# Patient Record
Sex: MALE | Race: WHITE | Employment: FULL TIME | URBAN - METROPOLITAN AREA
[De-identification: names, ages, dates, MRNs, and addresses within clinical notes are randomized per-mention and may not be internally consistent; named-entity substitution may affect disease eponyms.]

---

## 2020-09-15 ENCOUNTER — HOSPITAL ENCOUNTER (EMERGENCY)
Age: 45
Discharge: HOME | End: 2020-09-15
Attending: EMERGENCY MEDICINE
Payer: COMMERCIAL

## 2020-09-15 VITALS
RESPIRATION RATE: 14 BRPM | HEART RATE: 81 BPM | TEMPERATURE: 98.5 F | SYSTOLIC BLOOD PRESSURE: 124 MMHG | WEIGHT: 245 LBS | HEIGHT: 69 IN | OXYGEN SATURATION: 97 % | DIASTOLIC BLOOD PRESSURE: 83 MMHG | BODY MASS INDEX: 36.29 KG/M2

## 2020-09-15 LAB — STREP GRP A PCR: NEGATIVE

## 2020-09-15 PROCEDURE — 87880 STREP A ASSAY W/OPTIC: CPT

## 2020-09-15 PROCEDURE — 99283 EMERGENCY DEPT VISIT LOW MDM: CPT

## 2020-09-15 PROCEDURE — 6360000002 HC RX W HCPCS: Performed by: EMERGENCY MEDICINE

## 2020-09-15 RX ORDER — IBUPROFEN 800 MG/1
800 TABLET ORAL EVERY 8 HOURS PRN
Qty: 30 TABLET | Refills: 0 | Status: SHIPPED | OUTPATIENT
Start: 2020-09-15 | End: 2021-04-13

## 2020-09-15 RX ORDER — DEXAMETHASONE SODIUM PHOSPHATE 10 MG/ML
10 INJECTION, SOLUTION INTRAMUSCULAR; INTRAVENOUS ONCE
Status: DISCONTINUED | OUTPATIENT
Start: 2020-09-15 | End: 2020-09-15

## 2020-09-15 RX ADMIN — DEXAMETHASONE INTENSOL 10 MG: 1 SOLUTION, CONCENTRATE ORAL at 11:28

## 2020-09-15 ASSESSMENT — ENCOUNTER SYMPTOMS
EYE DISCHARGE: 0
DIARRHEA: 0
RHINORRHEA: 0
SHORTNESS OF BREATH: 0
EYE PAIN: 0
SINUS PRESSURE: 0
NAUSEA: 0
COUGH: 0
ABDOMINAL PAIN: 0
EYE REDNESS: 0
VOMITING: 0
WHEEZING: 0
SORE THROAT: 1
TROUBLE SWALLOWING: 1
BACK PAIN: 0
SINUS PAIN: 0

## 2020-09-15 ASSESSMENT — PAIN DESCRIPTION - PAIN TYPE: TYPE: ACUTE PAIN

## 2020-09-15 ASSESSMENT — PAIN SCALES - GENERAL: PAINLEVEL_OUTOF10: 9

## 2020-09-15 ASSESSMENT — PAIN DESCRIPTION - LOCATION: LOCATION: THROAT

## 2020-09-15 NOTE — ED PROVIDER NOTES
[facility]  Department of Emergency Medicine     Written by: Michell Goins DO  Patient Name: Sarahy Lizarraga  Attending Provider: No att. providers found  Admit Date: 9/15/2020  9:41 AM  MRN: [id number]                   : 1975        Chief Complaint   Patient presents with   Lorene rowe, seen by Dr Vicki Erazo yesterday and given ear drops and atb for throat, states they \"aren't working\"    Pharyngitis     states was sent in for possible abscess    - Chief complaint    Patient is 61-year-old male no sniffing past medical history. Patient states he has had right ear pain and sore throat since . Patient states that he recently saw his PCP yesterday where he was prescribed azithromycin which he has been taking. Patient states he has had increasing pain and difficulty swallowing prompted his visit today. In addition patient notes some subjective fevers and chills. Patient states that it is difficult to talk or swallow. Patient denies any nausea, vomiting, chest pain, cough, abdominal pain or shortness of breath. The history is provided by the patient. No  was used. Review of Systems   Constitutional: Positive for fever. Negative for chills. HENT: Positive for ear pain, sore throat and trouble swallowing. Negative for postnasal drip, rhinorrhea, sinus pressure and sinus pain. Eyes: Negative for pain, discharge and redness. Respiratory: Negative for cough, shortness of breath and wheezing. Cardiovascular: Negative for chest pain. Gastrointestinal: Negative for abdominal pain, diarrhea, nausea and vomiting. Genitourinary: Negative for dysuria and frequency. Musculoskeletal: Negative for arthralgias and back pain. Skin: Negative for rash and wound. Neurological: Negative for weakness and headaches. Hematological: Negative for adenopathy. All other systems reviewed and are negative.        Physical Exam  Vitals signs and nursing note reviewed. Constitutional:       Appearance: He is well-developed. HENT:      Head: Normocephalic and atraumatic. Right Ear: A middle ear effusion is present. Tympanic membrane is erythematous. Mouth/Throat:      Pharynx: Uvula midline. Pharyngeal swelling, oropharyngeal exudate and posterior oropharyngeal erythema present. Eyes:      Conjunctiva/sclera: Conjunctivae normal.   Neck:      Musculoskeletal: Normal range of motion and neck supple. Cardiovascular:      Rate and Rhythm: Normal rate and regular rhythm. Heart sounds: Normal heart sounds. No murmur. Pulmonary:      Effort: Pulmonary effort is normal. No respiratory distress. Breath sounds: Normal breath sounds. No wheezing or rales. Abdominal:      General: Bowel sounds are normal.      Palpations: Abdomen is soft. Tenderness: There is no abdominal tenderness. There is no guarding or rebound. Musculoskeletal:         General: No tenderness or deformity. Skin:     General: Skin is warm and dry. Neurological:      Mental Status: He is alert and oriented to person, place, and time. Cranial Nerves: No cranial nerve deficit. Coordination: Coordination normal.          Procedures       MDM  Number of Diagnoses or Management Options  Acute pharyngitis, unspecified etiology:   Diagnosis management comments: Patient 68-year-old male presents with chief complaint of sore throat and right ear pain. Patient recently seen the PCP and started on azithromycin. Vital signs stable on presentation physical exam mild erythema and effusion to right tympanic membrane. In addition erythema and exudate to right tonsil. Uvula midline no peritonsillar abscess noted. Patient swab for strep which was negative. Findings consistent with viral pharyngitis. Patient given Decadron 10 mg oral solution in emergency department. Discussed discharge with patient.   Instructed patient to take ibuprofen 800 mg up to 3 times daily as needed for pain. Patient instructed to also use warm salt water swishes to aid in pain. If no improvement in 3 days patient should follow-up with his primary care provider. In addition the patient notes any new or worrisome symptoms he should return emergency department as soon as possible for reevaluation. Patient voiced understanding of all instructions and was discharged home in stable condition. Amount and/or Complexity of Data Reviewed  Clinical lab tests: ordered and reviewed    Risk of Complications, Morbidity, and/or Mortality  Presenting problems: low  Diagnostic procedures: low  Management options: low    Patient Progress  Patient progress: stable       ED Course as of Sep 15 1105   Tue Sep 15, 2020   1056 ATTENDING PROVIDER ATTESTATION:     I have personally performed and/or participated in the history, exam, medical decision making, and procedures and agree with all pertinent clinical information. I have also reviewed and agree with the past medical, family and social history unless otherwise noted. I have discussed this patient in detail with the resident, and provided the instruction and education regarding tonsillitis. My findings/Plan: 64-year-old male with right ear pain and right pharyngitis he has erythema, hypertrophy, exudates of right tonsil with right tonsillar adenopathy however strep screen is negative. Patient is started on azithromycin yesterday we will have patient continue return for inability to swallow trismus or any other worsening concerning symptoms        [MF]      ED Course User Index  [MF] Cole Cohn, DO        --------------------------------------------- PAST HISTORY ---------------------------------------------  Past Medical History:  has no past medical history on file. Past Surgical History:  has no past surgical history on file. Social History:  reports that he has never smoked.  He has never used smokeless tobacco. He reports previous alcohol use. Family History: family history is not on file. The patients home medications have been reviewed. Allergies: Penicillins    -------------------------------------------------- RESULTS -------------------------------------------------  Labs:  Results for orders placed or performed during the hospital encounter of 09/15/20   Strep Screen Group A Throat    Specimen: Throat   Result Value Ref Range    Strep Grp A PCR Negative Negative       Radiology:  No orders to display       ------------------------- NURSING NOTES AND VITALS REVIEWED ---------------------------  Date / Time Roomed:  9/15/2020  9:41 AM  ED Bed Assignment:  02/02    The nursing notes within the ED encounter and vital signs as below have been reviewed. /79   Pulse 87   Temp 97.8 °F (36.6 °C) (Temporal)   Resp 16   Ht 5' 9\" (1.753 m)   Wt 245 lb (111.1 kg)   SpO2 98%   BMI 36.18 kg/m²   Oxygen Saturation Interpretation: Normal      ------------------------------------------ PROGRESS NOTES ------------------------------------------  11:05 AM EDT  I have spoken with the patient and discussed todays results, in addition to providing specific details for the plan of care and counseling regarding the diagnosis and prognosis. Their questions are answered at this time and they are agreeable with the plan. I discussed at length with them reasons for immediate return here for re evaluation. They will followup with their primary care physician by calling their office tomorrow. --------------------------------- ADDITIONAL PROVIDER NOTES ---------------------------------  At this time the patient is without objective evidence of an acute process requiring hospitalization or inpatient management. They have remained hemodynamically stable throughout their entire ED visit and are stable for discharge with outpatient follow-up.      The plan has been discussed in detail and they are aware of the specific conditions for emergent return, as well as the importance of follow-up. New Prescriptions    IBUPROFEN (ADVIL;MOTRIN) 800 MG TABLET    Take 1 tablet by mouth every 8 hours as needed for Pain       Diagnosis:  1. Acute pharyngitis, unspecified etiology        Disposition:  Patient's disposition: Discharge to home  Patient's condition is stable. Patient was seen and evaluated by myself and my attending No att. providers found. Assessment and Plan discussed with attending provider, please see attestation for final plan of care.      DO Los Lehman DO  Resident  09/15/20 0914

## 2021-04-13 ENCOUNTER — HOSPITAL ENCOUNTER (EMERGENCY)
Age: 46
Discharge: LEFT BEFORE DISCHARGE | End: 2021-04-13
Payer: COMMERCIAL

## 2021-04-13 VITALS
BODY MASS INDEX: 37.03 KG/M2 | HEIGHT: 69 IN | OXYGEN SATURATION: 97 % | HEART RATE: 89 BPM | WEIGHT: 250 LBS | RESPIRATION RATE: 18 BRPM | TEMPERATURE: 98.6 F | SYSTOLIC BLOOD PRESSURE: 128 MMHG | DIASTOLIC BLOOD PRESSURE: 71 MMHG

## 2021-04-13 PROCEDURE — 93005 ELECTROCARDIOGRAM TRACING: CPT

## 2021-04-13 ASSESSMENT — PAIN SCALES - GENERAL: PAINLEVEL_OUTOF10: 9

## 2021-04-13 ASSESSMENT — PAIN DESCRIPTION - ORIENTATION: ORIENTATION: LEFT;MID

## 2021-04-13 ASSESSMENT — PAIN DESCRIPTION - PAIN TYPE: TYPE: ACUTE PAIN

## 2021-04-13 ASSESSMENT — PAIN DESCRIPTION - DIRECTION: RADIATING_TOWARDS: JAW

## 2021-04-14 LAB
EKG ATRIAL RATE: 82 BPM
EKG P AXIS: 46 DEGREES
EKG P-R INTERVAL: 172 MS
EKG Q-T INTERVAL: 376 MS
EKG QRS DURATION: 78 MS
EKG QTC CALCULATION (BAZETT): 439 MS
EKG R AXIS: 106 DEGREES
EKG T AXIS: 34 DEGREES
EKG VENTRICULAR RATE: 82 BPM

## 2021-04-24 ENCOUNTER — HOSPITAL ENCOUNTER (INPATIENT)
Age: 46
LOS: 2 days | Discharge: ANOTHER HOSPITAL | DRG: 280 | End: 2021-04-26
Attending: EMERGENCY MEDICINE | Admitting: INTERNAL MEDICINE
Payer: COMMERCIAL

## 2021-04-24 ENCOUNTER — APPOINTMENT (OUTPATIENT)
Dept: GENERAL RADIOLOGY | Age: 46
DRG: 280 | End: 2021-04-24
Payer: COMMERCIAL

## 2021-04-24 ENCOUNTER — APPOINTMENT (OUTPATIENT)
Dept: ULTRASOUND IMAGING | Age: 46
DRG: 280 | End: 2021-04-24
Payer: COMMERCIAL

## 2021-04-24 ENCOUNTER — APPOINTMENT (OUTPATIENT)
Dept: CT IMAGING | Age: 46
DRG: 280 | End: 2021-04-24
Payer: COMMERCIAL

## 2021-04-24 DIAGNOSIS — I50.9 ACUTE CONGESTIVE HEART FAILURE, UNSPECIFIED HEART FAILURE TYPE (HCC): Primary | ICD-10-CM

## 2021-04-24 DIAGNOSIS — R93.89 ABNORMAL CT SCAN: ICD-10-CM

## 2021-04-24 DIAGNOSIS — I21.4 NSTEMI (NON-ST ELEVATED MYOCARDIAL INFARCTION) (HCC): ICD-10-CM

## 2021-04-24 DIAGNOSIS — K52.9 ENTERITIS: ICD-10-CM

## 2021-04-24 PROBLEM — I50.21 ACUTE SYSTOLIC (CONGESTIVE) HEART FAILURE (HCC): Status: ACTIVE | Noted: 2021-04-24

## 2021-04-24 LAB
ALBUMIN SERPL-MCNC: 3.9 G/DL (ref 3.5–5.2)
ALP BLD-CCNC: 109 U/L (ref 40–129)
ALT SERPL-CCNC: 26 U/L (ref 0–40)
AMYLASE: 50 U/L (ref 20–100)
ANION GAP SERPL CALCULATED.3IONS-SCNC: 8 MMOL/L (ref 7–16)
AST SERPL-CCNC: 14 U/L (ref 0–39)
BACTERIA: NORMAL /HPF
BASOPHILS ABSOLUTE: 0.07 E9/L (ref 0–0.2)
BASOPHILS RELATIVE PERCENT: 0.7 % (ref 0–2)
BILIRUB SERPL-MCNC: 0.5 MG/DL (ref 0–1.2)
BILIRUBIN URINE: NEGATIVE
BLOOD, URINE: NEGATIVE
BUN BLDV-MCNC: 10 MG/DL (ref 6–20)
CALCIUM SERPL-MCNC: 9.6 MG/DL (ref 8.6–10.2)
CHLORIDE BLD-SCNC: 104 MMOL/L (ref 98–107)
CLARITY: CLEAR
CO2: 26 MMOL/L (ref 22–29)
COLOR: YELLOW
CREAT SERPL-MCNC: 0.9 MG/DL (ref 0.7–1.2)
EKG ATRIAL RATE: 77 BPM
EKG P AXIS: 25 DEGREES
EKG P-R INTERVAL: 176 MS
EKG Q-T INTERVAL: 380 MS
EKG QRS DURATION: 78 MS
EKG QTC CALCULATION (BAZETT): 570 MS
EKG R AXIS: -62 DEGREES
EKG T AXIS: 15 DEGREES
EKG VENTRICULAR RATE: 135 BPM
EOSINOPHILS ABSOLUTE: 0.22 E9/L (ref 0.05–0.5)
EOSINOPHILS RELATIVE PERCENT: 2.3 % (ref 0–6)
GFR AFRICAN AMERICAN: >60
GFR NON-AFRICAN AMERICAN: >60 ML/MIN/1.73
GLUCOSE BLD-MCNC: 100 MG/DL (ref 74–99)
GLUCOSE URINE: NEGATIVE MG/DL
HBA1C MFR BLD: 5.1 % (ref 4–5.6)
HCT VFR BLD CALC: 41.5 % (ref 37–54)
HEMOGLOBIN: 14.7 G/DL (ref 12.5–16.5)
IMMATURE GRANULOCYTES #: 0.04 E9/L
IMMATURE GRANULOCYTES %: 0.4 % (ref 0–5)
KETONES, URINE: NEGATIVE MG/DL
LACTIC ACID, SEPSIS: 1 MMOL/L (ref 0.5–1.9)
LEUKOCYTE ESTERASE, URINE: NEGATIVE
LIPASE: 31 U/L (ref 13–60)
LIPASE: 37 U/L (ref 13–60)
LYMPHOCYTES ABSOLUTE: 2.01 E9/L (ref 1.5–4)
LYMPHOCYTES RELATIVE PERCENT: 20.7 % (ref 20–42)
MCH RBC QN AUTO: 33.1 PG (ref 26–35)
MCHC RBC AUTO-ENTMCNC: 35.4 % (ref 32–34.5)
MCV RBC AUTO: 93.5 FL (ref 80–99.9)
MONOCYTES ABSOLUTE: 0.57 E9/L (ref 0.1–0.95)
MONOCYTES RELATIVE PERCENT: 5.9 % (ref 2–12)
NEUTROPHILS ABSOLUTE: 6.79 E9/L (ref 1.8–7.3)
NEUTROPHILS RELATIVE PERCENT: 70 % (ref 43–80)
NITRITE, URINE: NEGATIVE
PDW BLD-RTO: 11.9 FL (ref 11.5–15)
PH UA: 7 (ref 5–9)
PLATELET # BLD: 279 E9/L (ref 130–450)
PMV BLD AUTO: 11.1 FL (ref 7–12)
POTASSIUM REFLEX MAGNESIUM: 4.2 MMOL/L (ref 3.5–5)
PRO-BNP: 1447 PG/ML (ref 0–125)
PROTEIN UA: NEGATIVE MG/DL
RBC # BLD: 4.44 E12/L (ref 3.8–5.8)
RBC UA: NORMAL /HPF (ref 0–2)
SARS-COV-2, NAAT: NOT DETECTED
SODIUM BLD-SCNC: 138 MMOL/L (ref 132–146)
SPECIFIC GRAVITY UA: 1.02 (ref 1–1.03)
TOTAL PROTEIN: 6.9 G/DL (ref 6.4–8.3)
TROPONIN: 0.29 NG/ML (ref 0–0.03)
TROPONIN: 0.32 NG/ML (ref 0–0.03)
TROPONIN: 0.41 NG/ML (ref 0–0.03)
UROBILINOGEN, URINE: 0.2 E.U./DL
WBC # BLD: 9.7 E9/L (ref 4.5–11.5)
WBC UA: NORMAL /HPF (ref 0–5)

## 2021-04-24 PROCEDURE — 83605 ASSAY OF LACTIC ACID: CPT

## 2021-04-24 PROCEDURE — 71045 X-RAY EXAM CHEST 1 VIEW: CPT

## 2021-04-24 PROCEDURE — 96361 HYDRATE IV INFUSION ADD-ON: CPT

## 2021-04-24 PROCEDURE — 83690 ASSAY OF LIPASE: CPT

## 2021-04-24 PROCEDURE — 99285 EMERGENCY DEPT VISIT HI MDM: CPT

## 2021-04-24 PROCEDURE — 87635 SARS-COV-2 COVID-19 AMP PRB: CPT

## 2021-04-24 PROCEDURE — 96375 TX/PRO/DX INJ NEW DRUG ADDON: CPT

## 2021-04-24 PROCEDURE — 94640 AIRWAY INHALATION TREATMENT: CPT

## 2021-04-24 PROCEDURE — 93005 ELECTROCARDIOGRAM TRACING: CPT | Performed by: EMERGENCY MEDICINE

## 2021-04-24 PROCEDURE — 6360000002 HC RX W HCPCS: Performed by: EMERGENCY MEDICINE

## 2021-04-24 PROCEDURE — 80053 COMPREHEN METABOLIC PANEL: CPT

## 2021-04-24 PROCEDURE — 96376 TX/PRO/DX INJ SAME DRUG ADON: CPT

## 2021-04-24 PROCEDURE — 6370000000 HC RX 637 (ALT 250 FOR IP): Performed by: STUDENT IN AN ORGANIZED HEALTH CARE EDUCATION/TRAINING PROGRAM

## 2021-04-24 PROCEDURE — 76705 ECHO EXAM OF ABDOMEN: CPT

## 2021-04-24 PROCEDURE — 87088 URINE BACTERIA CULTURE: CPT

## 2021-04-24 PROCEDURE — 74176 CT ABD & PELVIS W/O CONTRAST: CPT

## 2021-04-24 PROCEDURE — 96374 THER/PROPH/DIAG INJ IV PUSH: CPT

## 2021-04-24 PROCEDURE — 6360000002 HC RX W HCPCS: Performed by: INTERNAL MEDICINE

## 2021-04-24 PROCEDURE — 87040 BLOOD CULTURE FOR BACTERIA: CPT

## 2021-04-24 PROCEDURE — 6360000002 HC RX W HCPCS

## 2021-04-24 PROCEDURE — 83880 ASSAY OF NATRIURETIC PEPTIDE: CPT

## 2021-04-24 PROCEDURE — 6370000000 HC RX 637 (ALT 250 FOR IP): Performed by: INTERNAL MEDICINE

## 2021-04-24 PROCEDURE — 82150 ASSAY OF AMYLASE: CPT

## 2021-04-24 PROCEDURE — 2060000000 HC ICU INTERMEDIATE R&B

## 2021-04-24 PROCEDURE — 2580000003 HC RX 258: Performed by: INTERNAL MEDICINE

## 2021-04-24 PROCEDURE — 6360000002 HC RX W HCPCS: Performed by: STUDENT IN AN ORGANIZED HEALTH CARE EDUCATION/TRAINING PROGRAM

## 2021-04-24 PROCEDURE — 6360000004 HC RX CONTRAST MEDICATION: Performed by: RADIOLOGY

## 2021-04-24 PROCEDURE — 71275 CT ANGIOGRAPHY CHEST: CPT

## 2021-04-24 PROCEDURE — 83036 HEMOGLOBIN GLYCOSYLATED A1C: CPT

## 2021-04-24 PROCEDURE — 81001 URINALYSIS AUTO W/SCOPE: CPT

## 2021-04-24 PROCEDURE — 36415 COLL VENOUS BLD VENIPUNCTURE: CPT

## 2021-04-24 PROCEDURE — 94664 DEMO&/EVAL PT USE INHALER: CPT

## 2021-04-24 PROCEDURE — 93010 ELECTROCARDIOGRAM REPORT: CPT | Performed by: INTERNAL MEDICINE

## 2021-04-24 PROCEDURE — 84484 ASSAY OF TROPONIN QUANT: CPT

## 2021-04-24 PROCEDURE — 2580000003 HC RX 258: Performed by: STUDENT IN AN ORGANIZED HEALTH CARE EDUCATION/TRAINING PROGRAM

## 2021-04-24 PROCEDURE — 85025 COMPLETE CBC W/AUTO DIFF WBC: CPT

## 2021-04-24 RX ORDER — POTASSIUM CHLORIDE 7.45 MG/ML
10 INJECTION INTRAVENOUS PRN
Status: DISCONTINUED | OUTPATIENT
Start: 2021-04-24 | End: 2021-04-26 | Stop reason: HOSPADM

## 2021-04-24 RX ORDER — IPRATROPIUM BROMIDE AND ALBUTEROL SULFATE 2.5; .5 MG/3ML; MG/3ML
3 SOLUTION RESPIRATORY (INHALATION) ONCE
Status: COMPLETED | OUTPATIENT
Start: 2021-04-24 | End: 2021-04-24

## 2021-04-24 RX ORDER — BUDESONIDE 0.25 MG/2ML
250 INHALANT ORAL 2 TIMES DAILY
Status: DISCONTINUED | OUTPATIENT
Start: 2021-04-24 | End: 2021-04-26 | Stop reason: HOSPADM

## 2021-04-24 RX ORDER — 0.9 % SODIUM CHLORIDE 0.9 %
1000 INTRAVENOUS SOLUTION INTRAVENOUS ONCE
Status: COMPLETED | OUTPATIENT
Start: 2021-04-24 | End: 2021-04-24

## 2021-04-24 RX ORDER — POTASSIUM CHLORIDE 20 MEQ/1
40 TABLET, EXTENDED RELEASE ORAL PRN
Status: DISCONTINUED | OUTPATIENT
Start: 2021-04-24 | End: 2021-04-26 | Stop reason: HOSPADM

## 2021-04-24 RX ORDER — IPRATROPIUM BROMIDE AND ALBUTEROL SULFATE 2.5; .5 MG/3ML; MG/3ML
1 SOLUTION RESPIRATORY (INHALATION)
Status: DISCONTINUED | OUTPATIENT
Start: 2021-04-24 | End: 2021-04-26 | Stop reason: HOSPADM

## 2021-04-24 RX ORDER — PANTOPRAZOLE SODIUM 40 MG/1
40 TABLET, DELAYED RELEASE ORAL
Status: DISCONTINUED | OUTPATIENT
Start: 2021-04-25 | End: 2021-04-25

## 2021-04-24 RX ORDER — ASPIRIN 81 MG/1
324 TABLET, CHEWABLE ORAL ONCE
Status: COMPLETED | OUTPATIENT
Start: 2021-04-24 | End: 2021-04-24

## 2021-04-24 RX ORDER — FENTANYL CITRATE 50 UG/ML
25 INJECTION, SOLUTION INTRAMUSCULAR; INTRAVENOUS 3 TIMES DAILY PRN
Status: DISCONTINUED | OUTPATIENT
Start: 2021-04-24 | End: 2021-04-26 | Stop reason: HOSPADM

## 2021-04-24 RX ORDER — METHYLPREDNISOLONE SODIUM SUCCINATE 125 MG/2ML
125 INJECTION, POWDER, LYOPHILIZED, FOR SOLUTION INTRAMUSCULAR; INTRAVENOUS ONCE
Status: COMPLETED | OUTPATIENT
Start: 2021-04-24 | End: 2021-04-24

## 2021-04-24 RX ORDER — DOXYCYCLINE HYCLATE 100 MG/1
100 CAPSULE ORAL ONCE
Status: COMPLETED | OUTPATIENT
Start: 2021-04-24 | End: 2021-04-24

## 2021-04-24 RX ORDER — ACETAMINOPHEN 325 MG/1
650 TABLET ORAL EVERY 4 HOURS PRN
Status: DISCONTINUED | OUTPATIENT
Start: 2021-04-24 | End: 2021-04-26 | Stop reason: HOSPADM

## 2021-04-24 RX ORDER — ARFORMOTEROL TARTRATE 15 UG/2ML
15 SOLUTION RESPIRATORY (INHALATION) 2 TIMES DAILY
Status: DISCONTINUED | OUTPATIENT
Start: 2021-04-24 | End: 2021-04-26 | Stop reason: HOSPADM

## 2021-04-24 RX ORDER — SODIUM CHLORIDE 0.9 % (FLUSH) 0.9 %
10 SYRINGE (ML) INJECTION PRN
Status: DISCONTINUED | OUTPATIENT
Start: 2021-04-24 | End: 2021-04-26 | Stop reason: HOSPADM

## 2021-04-24 RX ORDER — FUROSEMIDE 10 MG/ML
20 INJECTION INTRAMUSCULAR; INTRAVENOUS ONCE
Status: COMPLETED | OUTPATIENT
Start: 2021-04-24 | End: 2021-04-24

## 2021-04-24 RX ORDER — ASPIRIN 81 MG/1
81 TABLET, CHEWABLE ORAL DAILY
Status: ON HOLD | COMMUNITY
End: 2021-04-27 | Stop reason: HOSPADM

## 2021-04-24 RX ADMIN — METHYLPREDNISOLONE SODIUM SUCCINATE 125 MG: 125 INJECTION, POWDER, FOR SOLUTION INTRAMUSCULAR; INTRAVENOUS at 08:45

## 2021-04-24 RX ADMIN — NITROGLYCERIN 1 INCH: 20 OINTMENT TOPICAL at 16:18

## 2021-04-24 RX ADMIN — CEFTRIAXONE 1000 MG: 1 INJECTION, POWDER, FOR SOLUTION INTRAMUSCULAR; INTRAVENOUS at 16:25

## 2021-04-24 RX ADMIN — ENOXAPARIN SODIUM 120 MG: 120 INJECTION SUBCUTANEOUS at 16:19

## 2021-04-24 RX ADMIN — DOXYCYCLINE HYCLATE 100 MG: 100 CAPSULE ORAL at 18:57

## 2021-04-24 RX ADMIN — ASPIRIN 324 MG: 81 TABLET, CHEWABLE ORAL at 16:18

## 2021-04-24 RX ADMIN — HYDROMORPHONE HYDROCHLORIDE 0.5 MG: 1 INJECTION, SOLUTION INTRAMUSCULAR; INTRAVENOUS; SUBCUTANEOUS at 09:01

## 2021-04-24 RX ADMIN — BUDESONIDE 250 MCG: 0.25 SUSPENSION RESPIRATORY (INHALATION) at 20:09

## 2021-04-24 RX ADMIN — HYDROMORPHONE HYDROCHLORIDE 0.5 MG: 1 INJECTION, SOLUTION INTRAMUSCULAR; INTRAVENOUS; SUBCUTANEOUS at 14:58

## 2021-04-24 RX ADMIN — FENTANYL CITRATE 25 MCG: 50 INJECTION, SOLUTION INTRAMUSCULAR; INTRAVENOUS at 20:59

## 2021-04-24 RX ADMIN — ARFORMOTEROL TARTRATE 15 MCG: 15 SOLUTION RESPIRATORY (INHALATION) at 20:09

## 2021-04-24 RX ADMIN — IOPAMIDOL 80 ML: 755 INJECTION, SOLUTION INTRAVENOUS at 11:55

## 2021-04-24 RX ADMIN — IPRATROPIUM BROMIDE AND ALBUTEROL SULFATE 1 AMPULE: .5; 3 SOLUTION RESPIRATORY (INHALATION) at 20:09

## 2021-04-24 RX ADMIN — IPRATROPIUM BROMIDE AND ALBUTEROL SULFATE 3 AMPULE: .5; 3 SOLUTION RESPIRATORY (INHALATION) at 08:51

## 2021-04-24 RX ADMIN — FUROSEMIDE 20 MG: 10 INJECTION, SOLUTION INTRAMUSCULAR; INTRAVENOUS at 18:57

## 2021-04-24 RX ADMIN — SODIUM CHLORIDE 1000 ML: 9 INJECTION, SOLUTION INTRAVENOUS at 08:45

## 2021-04-24 RX ADMIN — SODIUM CHLORIDE, PRESERVATIVE FREE 10 ML: 5 INJECTION INTRAVENOUS at 20:59

## 2021-04-24 RX ADMIN — Medication 0.5 MG: at 14:58

## 2021-04-24 RX ADMIN — METOPROLOL TARTRATE 12.5 MG: 25 TABLET, FILM COATED ORAL at 16:19

## 2021-04-24 ASSESSMENT — PAIN SCALES - GENERAL
PAINLEVEL_OUTOF10: 8
PAINLEVEL_OUTOF10: 3
PAINLEVEL_OUTOF10: 0

## 2021-04-24 ASSESSMENT — ENCOUNTER SYMPTOMS
EYE PAIN: 0
COUGH: 0
WHEEZING: 0
CONSTIPATION: 0
DIARRHEA: 0
NAUSEA: 0
VOMITING: 0
SHORTNESS OF BREATH: 1
ABDOMINAL PAIN: 1
RHINORRHEA: 0

## 2021-04-24 ASSESSMENT — PAIN DESCRIPTION - PAIN TYPE: TYPE: ACUTE PAIN

## 2021-04-24 ASSESSMENT — PAIN DESCRIPTION - DESCRIPTORS: DESCRIPTORS: TIGHTNESS

## 2021-04-24 ASSESSMENT — PAIN DESCRIPTION - ONSET
ONSET: SUDDEN
ONSET: GRADUAL

## 2021-04-24 ASSESSMENT — PAIN DESCRIPTION - PROGRESSION: CLINICAL_PROGRESSION: GRADUALLY WORSENING

## 2021-04-24 ASSESSMENT — PAIN DESCRIPTION - ORIENTATION: ORIENTATION: MID;LEFT

## 2021-04-24 ASSESSMENT — PAIN DESCRIPTION - LOCATION: LOCATION: CHEST

## 2021-04-24 NOTE — ED PROVIDER NOTES
[facility]          Pt Name: Vince Alvarado  MRN: [id number]  Birthdate 1975  Date of evaluation: 4/24/2021      CHIEF COMPLAINT  Chief Complaint   Patient presents with    Shortness of Breath     100% on RA in Triage    Abdominal Pain     RUQ        Vince Alvarado is a 50 y.o. male presenting to the ED with chief complaint of shortness of breath, abdominal pain. The patient symptoms began 2 wks ago. His abdominal pain is located in the right upper quadrant of the abdomen. He describes the pain as constant and worse with eating. He denies any history of stomach ulcers. Admits to shortness of breath which is worse when he lies flat at night. His short of breath is also worse when he experiences pain in his abdomen. He denies sick contacts. He denies any cardiac history. Patient's complains have been constant without any alleviating factors and mild in severity. Better with eating. HPI       Patient has a medical history as listed below:   History reviewed. No pertinent past medical history. Patient Active Problem List    Diagnosis Date Noted    Acute systolic (congestive) heart failure ([facility] [address] 04/24/2021       Review of Systems   Constitutional: Negative for chills and fever. HENT: Negative for congestion and rhinorrhea. Eyes: Negative for pain and visual disturbance. Respiratory: Positive for shortness of breath. Negative for cough and wheezing. Cardiovascular: Negative for chest pain and leg swelling. Gastrointestinal: Positive for abdominal pain. Negative for constipation, diarrhea, nausea and vomiting. Genitourinary: Negative for dysuria, frequency and hematuria. Musculoskeletal: Negative for arthralgias and neck pain. Neurological: Negative for numbness and headaches. Physical Exam  Vitals signs and nursing note reviewed. Constitutional:       General: He is not in acute distress. Appearance: He is well-developed.    HENT:      Head: Normocephalic and atraumatic. Nose: Nose normal.      Mouth/Throat:      Pharynx: Oropharynx is clear. Eyes:      Conjunctiva/sclera: Conjunctivae normal.      Pupils: Pupils are equal, round, and reactive to light. Neck:      Musculoskeletal: Normal range of motion. No neck rigidity. Cardiovascular:      Rate and Rhythm: Normal rate and regular rhythm. Heart sounds: Normal heart sounds. Pulmonary:      Effort: Pulmonary effort is normal. No respiratory distress. Breath sounds: Wheezing present. No rhonchi or rales. Chest:      Chest wall: No tenderness. Abdominal:      General: Abdomen is flat. There is no distension. Palpations: Abdomen is soft. There is no hepatomegaly or mass. Tenderness: There is abdominal tenderness in the right upper quadrant. There is no right CVA tenderness or left CVA tenderness. Skin:     General: Skin is warm and dry. Findings: No rash. Neurological:      Mental Status: He is alert. Mental status is at baseline. Procedures     [facility]     ED Course as of Apr 25 0647   Sat Apr 24, 2021   1448 Dr. Malinda Benjamin will admit the patient.    [WL]   1500 IMPRESSION:  No evidence of pulmonary embolism.     Mild cardiomegaly and suspect interstitial edema.      CTA PULMONARY W CONTRAST [WL]   0 Spoke with Dr. Malinda Benjamin, he spoke with cardiology who would like us to place 1 inch of Nitropaste every 8 hours and Lopressor 12-1/2 mg twice daily started here in the ED. [WL]   8936 With patient's findings of intra-abdominal infection and possibility of pneumonia we covered him with antibiotics. [WL]      ED Course User Index  [WL] Clau Glance, DO        Patient presents to the ED for evaluation. This patient was seen and evaluated with the attending.   Work-up was performed with concerns for but not limited to ACS, arrhythmia, Pneumonia, PE, Viral illness, Bowel obstruction, Constipation and Pancreatitis, acute cholecystitis, gallstones  Lab work and imaging showed duodenitis with opacities in the lungs consistent with fluid overload/infectious etiology. Patient was covered with antibiotics and given his elevated BNP, short of breath, cardiomegaly and interstitial edema in the lungs he may be experiencing acute CHF. He was given Lasix. Medicine agreed on admission. His elevated troponin may be secondary to demand ischemia or NSTEMI with his shortness of breath as a ACS equivalent. He was given Lovenox here in the emergency department. Patient requires continued workup and management of their symptoms and will be admitted to the hospital for further evaluation and treatment.            --------------------------------------------- PAST HISTORY ---------------------------------------------  Past Medical History:  has no past medical history on file. Past Surgical History:  has no past surgical history on file. Social History:  reports that he has been smoking. He has been smoking about 1.00 pack per day. He has never used smokeless tobacco. He reports previous alcohol use. He reports that he does not use drugs. Family History: family history is not on file. The patients home medications have been reviewed.     Allergies: Penicillins    -------------------------------------------------- RESULTS -------------------------------------------------    LABS:  Results for orders placed or performed during the hospital encounter of 04/24/21   COVID-19, Rapid    Specimen: Nasopharyngeal Swab   Result Value Ref Range    SARS-CoV-2, NAAT Not Detected Not Detected   CBC Auto Differential   Result Value Ref Range    WBC 9.7 4.5 - 11.5 E9/L    RBC 4.44 3.80 - 5.80 E12/L    Hemoglobin 14.7 12.5 - 16.5 g/dL    Hematocrit 41.5 37.0 - 54.0 %    MCV 93.5 80.0 - 99.9 fL    MCH 33.1 26.0 - 35.0 pg    MCHC 35.4 (H) 32.0 - 34.5 %    RDW 11.9 11.5 - 15.0 fL    Platelets 273 997 - 277 E9/L    MPV 11.1 7.0 - 12.0 fL    Neutrophils % 70.0 43.0 - 80.0 %    Immature Granulocytes % 0.4 0.0 - 5.0 %    Lymphocytes % 20.7 20.0 - 42.0 %    Monocytes % 5.9 2.0 - 12.0 %    Eosinophils % 2.3 0.0 - 6.0 %    Basophils % 0.7 0.0 - 2.0 %    Neutrophils Absolute 6.79 1.80 - 7.30 E9/L    Immature Granulocytes # 0.04 E9/L    Lymphocytes Absolute 2.01 1.50 - 4.00 E9/L    Monocytes Absolute 0.57 0.10 - 0.95 E9/L    Eosinophils Absolute 0.22 0.05 - 0.50 E9/L    Basophils Absolute 0.07 0.00 - 0.20 E9/L   Lipase   Result Value Ref Range    Lipase 37 13 - 60 U/L   Troponin   Result Value Ref Range    Troponin 0.41 (H) 0.00 - 0.03 ng/mL   Lactate, Sepsis   Result Value Ref Range    Lactic Acid, Sepsis 1.0 0.5 - 1.9 mmol/L   Comprehensive Metabolic Panel w/ Reflex to MG   Result Value Ref Range    Sodium 138 132 - 146 mmol/L    Potassium reflex Magnesium 4.2 3.5 - 5.0 mmol/L    Chloride 104 98 - 107 mmol/L    CO2 26 22 - 29 mmol/L    Anion Gap 8 7 - 16 mmol/L    Glucose 100 (H) 74 - 99 mg/dL    BUN 10 6 - 20 mg/dL    CREATININE 0.9 0.7 - 1.2 mg/dL    GFR Non-African American >60 >=60 mL/min/1.73    GFR African American >60     Calcium 9.6 8.6 - 10.2 mg/dL    Total Protein 6.9 6.4 - 8.3 g/dL    Albumin 3.9 3.5 - 5.2 g/dL    Total Bilirubin 0.5 0.0 - 1.2 mg/dL    Alkaline Phosphatase 109 40 - 129 U/L    ALT 26 0 - 40 U/L    AST 14 0 - 39 U/L   Urinalysis with Microscopic   Result Value Ref Range    Color, UA Yellow Straw/Yellow    Clarity, UA Clear Clear    Glucose, Ur Negative Negative mg/dL    Bilirubin Urine Negative Negative    Ketones, Urine Negative Negative mg/dL    Specific Gravity, UA 1.020 1.005 - 1.030    Blood, Urine Negative Negative    pH, UA 7.0 5.0 - 9.0    Protein, UA Negative Negative mg/dL    Urobilinogen, Urine 0.2 <2.0 E.U./dL    Nitrite, Urine Negative Negative    Leukocyte Esterase, Urine Negative Negative    WBC, UA NONE 0 - 5 /HPF    RBC, UA NONE 0 - 2 /HPF    Bacteria, UA NONE SEEN None Seen /HPF   Brain Natriuretic Peptide   Result Value Ref Range    Pro-BNP 1,447 (H) 0 - 125 pg/mL   Amylase   Result Value Ref Range    Amylase 50 20 - 100 U/L   Lipase   Result Value Ref Range    Lipase 31 13 - 60 U/L   Hemoglobin A1C   Result Value Ref Range    Hemoglobin A1C 5.1 4.0 - 5.6 %   Troponin   Result Value Ref Range    Troponin 0.29 (H) 0.00 - 0.03 ng/mL   Troponin   Result Value Ref Range    Troponin 0.32 (H) 0.00 - 0.03 ng/mL   Brain Natriuretic Peptide   Result Value Ref Range    Pro-BNP 1,826 (H) 0 - 125 pg/mL   CBC Auto Differential   Result Value Ref Range    WBC 21.1 (H) 4.5 - 11.5 E9/L    RBC 4.49 3.80 - 5.80 E12/L    Hemoglobin 14.5 12.5 - 16.5 g/dL    Hematocrit 41.5 37.0 - 54.0 %    MCV 92.4 80.0 - 99.9 fL    MCH 32.3 26.0 - 35.0 pg    MCHC 34.9 (H) 32.0 - 34.5 %    RDW 11.9 11.5 - 15.0 fL    Platelets 090 262 - 417 E9/L    MPV 11.1 7.0 - 12.0 fL    Neutrophils % 81.4 (H) 43.0 - 80.0 %    Immature Granulocytes % 1.8 0.0 - 5.0 %    Lymphocytes % 10.5 (L) 20.0 - 42.0 %    Monocytes % 5.4 2.0 - 12.0 %    Eosinophils % 0.5 0.0 - 6.0 %    Basophils % 0.4 0.0 - 2.0 %    Neutrophils Absolute 17.17 (H) 1.80 - 7.30 E9/L    Immature Granulocytes # 0.38 E9/L    Lymphocytes Absolute 2.22 1.50 - 4.00 E9/L    Monocytes Absolute 1.14 (H) 0.10 - 0.95 E9/L    Eosinophils Absolute 0.10 0.05 - 0.50 E9/L    Basophils Absolute 0.08 0.00 - 0.20 U9/G   Basic Metabolic Panel   Result Value Ref Range    Sodium 136 132 - 146 mmol/L    Potassium 3.9 3.5 - 5.0 mmol/L    Chloride 100 98 - 107 mmol/L    CO2 21 (L) 22 - 29 mmol/L    Anion Gap 15 7 - 16 mmol/L    Glucose 107 (H) 74 - 99 mg/dL    BUN 13 6 - 20 mg/dL    CREATININE 0.9 0.7 - 1.2 mg/dL    GFR Non-African American >60 >=60 mL/min/1.73    GFR African American >60     Calcium 9.6 8.6 - 10.2 mg/dL   Magnesium   Result Value Ref Range    Magnesium 2.1 1.6 - 2.6 mg/dL   Lipid Panel   Result Value Ref Range    Cholesterol, Total 175 0 - 199 mg/dL    Triglycerides 134 0 - 149 mg/dL    HDL 28 >40 mg/dL    LDL Calculated 120 (H) 0 - 99 mg/dL    VLDL Cholesterol Calculated 27 mg/dL   Lipase   Result Value Ref Range    Lipase 25 13 - 60 U/L   Hepatic Function Panel   Result Value Ref Range    Total Protein 6.9 6.4 - 8.3 g/dL    Albumin 3.8 3.5 - 5.2 g/dL    Alkaline Phosphatase 106 40 - 129 U/L    ALT 21 0 - 40 U/L    AST 13 0 - 39 U/L    Total Bilirubin 0.4 0.0 - 1.2 mg/dL    Bilirubin, Direct <0.2 0.0 - 0.3 mg/dL    Bilirubin, Indirect see below 0.0 - 1.0 mg/dL   Phosphorus   Result Value Ref Range    Phosphorus 4.2 2.5 - 4.5 mg/dL   Uric Acid   Result Value Ref Range    Uric Acid, Serum 6.3 3.4 - 7.0 mg/dL   TSH without Reflex   Result Value Ref Range    TSH 0.869 0.270 - 4.200 uIU/mL   EKG 12 Lead   Result Value Ref Range    Ventricular Rate 135 BPM    Atrial Rate 77 BPM    P-R Interval 176 ms    QRS Duration 78 ms    Q-T Interval 380 ms    QTc Calculation (Bazett) 570 ms    P Axis 25 degrees    R Axis -62 degrees    T Axis 15 degrees       RADIOLOGY:  CTA PULMONARY W CONTRAST   Final Result   No evidence of pulmonary embolism. Mild cardiomegaly and suspect interstitial edema. CT ABDOMEN PELVIS WO CONTRAST Additional Contrast? None   Final Result   Acute inflammatory changes of the duodenum, compatible with duodenitis. Bilateral lung base ground-glass airspace opacities and septal thickening can   be seen with edema. Findings favored less likely to be related to pneumonia. Recommend radiographic follow-up. US GALLBLADDER RUQ   Final Result   There is no sonographic evidence of cholelithiasis or cholecystitis. No   biliary ductal dilatation. Fatty infiltration of the liver         XR CHEST PORTABLE   Final Result   Shallow lung volumes with reticular airspace opacities at both lung bases.    Findings are nonspecific and may be on the basis of atelectasis multifocal   pneumonia.                   ------------------------- NURSING NOTES AND VITALS REVIEWED ---------------------------  Date / Time Roomed:  4/24/2021  6:58 AM  ED Bed Assignment:  [id number]    The nursing notes within the ED encounter and vital signs as below have been reviewed. Patient Vitals for the past 24 hrs:   BP Temp Temp src Pulse Resp SpO2 Weight   04/25/21 0505 -- -- -- -- -- -- 249 lb 9 oz (113.2 kg)   04/25/21 0003 112/68 98.2 °F (36.8 °C) Oral 69 20 96 % --   04/24/21 2045 109/62 98.7 °F (37.1 °C) Oral 71 20 93 % --   04/24/21 1715 -- -- -- -- -- 94 % --   04/24/21 1647 126/72 97.7 °F (36.5 °C) Oral 80 18 -- 247 lb 8 oz (112.3 kg)   04/24/21 1558 (!) 143/66 98.1 °F (36.7 °C) Oral 74 16 96 % --   04/24/21 1459 (!) 143/66 -- -- 89 18 96 % --   04/24/21 1141 125/70 -- -- 90 18 97 % --           Counseling:  I have spoken with the patient/family members and discussed todays results, in addition to providing specific details for the plan of care and counseling regarding the diagnosis and prognosis. Their questions are answered at this time and they are agreeable with the plan of admission. This patient has remained hemodynamically stable during their ED course. Diagnosis:  1. Acute congestive heart failure, unspecified heart failure type ([facility] [address]    2. Enteritis    3. NSTEMI (non-ST elevated myocardial infarction) ([facility] [address]    4. Abnormal CT scan        Disposition:  Patient's disposition: Admit  Patient's condition is stable. NOTE:  This report was transcribed using voice recognition software. Efforts were made to ensure accuracy; however, inadvertent computerized transcription errors may be present.          Mendy Hanna DO  Resident  04/25/21 8252

## 2021-04-24 NOTE — CONSULTS
CARDIOLOGY CONSULTATION    Patient Name:  Leslye Albrecht    :  1975    Reason for Consultation:   Chest discomfort and shortness of breath with abnormal troponin and proBNP    History of Present Illness:   Leslye Albrecht presents to [facility], following a 2-week history of recurrent retrosternal chest pressure and burning as well as burning in his lower extremities both at rest and with exertion. On 2021, Mr. Eliazar Rivero went to the emergency room and had an ECG obtained following complaints of chest discomfort and shortness of breath. I am unable to obtain any blood work from that visit however. As his symptoms increased in frequency and duration he came back to the emergency room for further evaluation and on this occasion demonstrated an a mild elevation in troponin as well as proBNP. He admits to smoking and 2 weeks ago had only 2 alcoholic beverages and does not drink on a regular basis. He tried to \"tough it out\" but realized something was seriously wrong and finally came to the emergency room again today. He has no previous history of cardiac disease nor previous history of diabetes mellitus or hyperlipidemia. Past Medical History:   has no past medical history on file. Surgical History:   has no past surgical history on file. Social History:   reports that he has been smoking. He has been smoking about 1.00 pack per day. He has never used smokeless tobacco. He reports previous alcohol use. He reports that he does not use drugs. Family History:  family history is remarkable for mother alive with lung disease but no previous myocardial infarction in father alive and generally healthy. Medications:  Prior to Admission medications    Medication Sig Start Date End Date Taking?  Authorizing Provider   aspirin 81 MG chewable tablet Take 81 mg by mouth daily   Yes Historical Provider, MD       Allergies:  Penicillins     Review of Systems:   · Constitutional: there has been no unanticipated weight loss. There's been no significant change in energy level, sleep pattern or activity level. No fever chills or rigors. · Eyes: No visual changes or diplopia. No scleral icterus. · ENT: No Headaches, hearing loss or vertigo. No mouth sores or sore throat. No change in taste or smell. · Cardiovascular: Has significant repetitive chest discomfort with exertion and rest as well as, dyspnea on exertion, denies palpitations, loss of consciousness, no phlebitis, no claudication. · Respiratory: No cough or wheezing, no sputum production. No hemoptysis, pleuritic pain. · Gastrointestinal: No abdominal pain, appetite loss, blood in stools. No change in bowel habits. No hematemesis  · Genitourinary: No dysuria, trouble voiding or hematuria. No nocturia or increased frequency. · Musculoskeletal:  No gait disturbance, weakness or joint complaints. · Integumentary: No rash or pruritis. · Neurological: No headache, diplopia, change in muscle strength, numbness or tingling. No change in gait, balance, coordination, mood, affect, memory, mentation, behavior. · Psychiatric: No anxiety or depression. · Endocrine: No temperature intolerance. No excessive thirst, fluid intake, or urination. No tremor. · Hematologic/Lymphatic: No abnormal bruising or bleeding, blood clots or swollen lymph nodes. · Allergic/Immunologic: No nasal congestion or hives. Physical Examination:    Vital Signs: /72   Pulse 80   Temp 97.7 °F (36.5 °C) (Oral)   Resp 18   Ht 5' 9\" (1.753 m)   Wt 247 lb 8 oz (112.3 kg)   SpO2 96%   BMI 36.55 kg/m²   General appearance: Well preserved, mesomorphic body habitus, alert, no distress. Skin: Skin color, texture, turgor normal. No rashes or lesions. No induration or tightening palpated. Male pattern alopecia  Head: Normocephalic. No masses, lesions, tenderness or abnormalities  Eyes: Conjunctivae/corneas clear. PERRL, EOMs intact. Sclera non icteric.   Ears: External ears normal. Canals clear. TM's clear bilaterally. Hearing normal to finger rub. Nose/Sinuses: Nares normal. Septum midline. Mucosa normal. No drainage or sinus tenderness. Oropharynx: Lips, mucosa, and tongue normal. Oropharynx clear with no exudate seen. Neck: Neck supple and symmetric. No adenopathy. Thyroid symmetric, normal size, without nodules. Trachea is midline. Carotids brisk in upstroke without bruits, no abnormal JVP noted at 45°. Chest: Even excursion  Lungs: Lungs clear to auscultation bilaterally. No retractions or use of accessory muscles. No tactile vocal fremitus. No rhonchi, crackles or rales. Heart:  S1 > S2. Regular rhythm. No gallop or murmur. No rub, palpable thrill or heave noted. PMI 5th intercostal space midclavicular line. Abdomen: Abdomen soft, moderately protuberant, non-tender. BS normal. No masses, organomegaly. No hernia noted. Extremities: Extremities normal. No deformities, edema, or skin discoloration. No cyanosis or clubbing noted to the nails. Peripheral pulses present 2+ upper extremities and present 2+  lower extremities. Musculoskeletal: Spine ROM normal. Muscular strength intact. Neuro: Cranial nerves intact. Motor: Strength 5/5 in all extremities. Reflexes 2+ in all extremities. No focal weakness. Sensory: grossly normal to touch. Coordination intact. Pertinent Labs:  CBC:   Recent Labs     04/24/21  0818   WBC 9.7   HGB 14.7        BMP:  Recent Labs     04/24/21  0818      K 4.2      CO2 26   BUN 10   CREATININE 0.9   GLUCOSE 100*   LABGLOM >60     ABGs: No results found for: PH, PO2, PCO2  INR: No results for input(s): INR in the last 72 hours.   PRO-BNP:   Lab Results   Component Value Date    PROBNP 1,447 (H) 04/24/2021      Cardiac Injury Profile:   Recent Labs     04/24/21  0818   TROPONINI 0.41*      Lipid Profile: No results found for: TRIG, HDL, LDLCALC, CHOL   Thyroid: No results found for: TSHFT4, TSH   Hemoglobin A1C: No components found for: HGBA1C   ECG:  See report    Radiology:  Ct Abdomen Pelvis Wo Contrast Additional Contrast? None    Result Date: 4/24/2021  EXAMINATION: CT OF THE ABDOMEN AND PELVIS WITHOUT CONTRAST 4/24/2021 10:54 am TECHNIQUE: CT of the abdomen and pelvis was performed without the administration of intravenous contrast. Multiplanar reformatted images are provided for review. Dose modulation, iterative reconstruction, and/or weight based adjustment of the mA/kV was utilized to reduce the radiation dose to as low as reasonably achievable. COMPARISON: CT a chest 04/24/2021 HISTORY: ORDERING SYSTEM PROVIDED HISTORY: general abd pain TECHNOLOGIST PROVIDED HISTORY: Reason for exam:->general abd pain Additional Contrast?->None Decision Support Exception->Emergency Medical Condition (MA) FINDINGS: Lower Chest: In the lung bases there is bilateral septal thickening. There are bilateral posterior ground-glass airspace opacities which may be related to edema or infectious process. Organs: There is diffuse low-attenuation of the liver, compatible with fatty infiltration. The gallbladder is present. The spleen, pancreas, adrenal glands, kidneys are unremarkable. There is no hydronephrosis or urolithiasis. GI/Bowel: There is circumferential wall thickening and inflammation of the duodenum, compatible with acute duodenitis. No extraluminal air is identified. No bowel obstruction. No appendiceal inflammation. Pelvis: Urinary bladder is unremarkable in CT appearance. Peritoneum/Retroperitoneum: No free fluid or free air. Aorta is normal in caliber. Bones/Soft Tissues: No acute abnormality. There are degenerative disc changes at L4-5. Acute inflammatory changes of the duodenum, compatible with duodenitis. Bilateral lung base ground-glass airspace opacities and septal thickening can be seen with edema. Findings favored less likely to be related to pneumonia. Recommend radiographic follow-up.      Us Gallbladder Ruq    Result Date: 4/24/2021  EXAMINATION: RIGHT UPPER QUADRANT ULTRASOUND 4/24/2021 9:11 am COMPARISON: None. HISTORY: ORDERING SYSTEM PROVIDED HISTORY: PAIN TECHNOLOGIST PROVIDED HISTORY: Reason for exam:->PAIN What reading provider will be dictating this exam?->CRC FINDINGS: LIVER:  There is increased echogenicity of the liver, which can be seen with fatty infiltration. Liver measures up to 19.8 cm craniocaudal. BILIARY SYSTEM:  There is no evidence of cholelithiasis or gallbladder wall thickening. Common bile duct is within normal limits measuring 5-6 mm. RIGHT KIDNEY: The right kidney is grossly unremarkable without evidence of hydronephrosis. PANCREAS: Not well visualized OTHER: No evidence of right upper quadrant ascites. There is no sonographic evidence of cholelithiasis or cholecystitis. No biliary ductal dilatation. Fatty infiltration of the liver     Xr Chest Portable    Result Date: 4/24/2021  EXAMINATION: ONE XRAY VIEW OF THE CHEST 4/24/2021 8:40 am COMPARISON: None. HISTORY: ORDERING SYSTEM PROVIDED HISTORY: SOB TECHNOLOGIST PROVIDED HISTORY: Reason for exam:->SOB FINDINGS: Lung volumes are shallow and there is elevation of the left hemidiaphragm. There are faint linear/reticular airspace opacities at both lung bases. No evidence of a sizable pleural effusion. No pneumothorax. Heart size is unable to be accurately assessed on this single portable view of the chest. No acute osseous abnormality. Shallow lung volumes with reticular airspace opacities at both lung bases. Findings are nonspecific and may be on the basis of atelectasis multifocal pneumonia. Cta Pulmonary W Contrast    Result Date: 4/24/2021  EXAMINATION: CTA OF THE CHEST 4/24/2021 11:54 am TECHNIQUE: CTA of the chest was performed after the administration of intravenous contrast.  Multiplanar reformatted images are provided for review. MIP images are provided for review.  Dose modulation, iterative reconstruction, and/or weight based adjustment of the mA/kV was utilized to reduce the radiation dose to as low as reasonably achievable. COMPARISON: None. HISTORY: ORDERING SYSTEM PROVIDED HISTORY: possible PE TECHNOLOGIST PROVIDED HISTORY: Reason for exam:->possible PE Decision Support Exception->Emergency Medical Condition (MA) FINDINGS: Pulmonary Arteries: Pulmonary arteries are adequately opacified for evaluation. No evidence of intraluminal filling defect to suggest pulmonary embolism. Main pulmonary artery is normal in caliber. Mediastinum: No evidence of mediastinal lymphadenopathy. The heart and pericardium demonstrate no acute abnormality. There is no acute abnormality of the thoracic aorta. Lungs/pleura: There are mild hazy opacity in both lungs with mild infiltrates at the posterior lung bases. No evidence of pleural effusion or pneumothorax. Upper Abdomen: Limited images of the upper abdomen are unremarkable. Soft Tissues/Bones: No acute bone or soft tissue abnormality. No evidence of pulmonary embolism. Mild cardiomegaly and suspect interstitial edema. Assessment:    Active Problems:    Acute systolic (congestive) heart failure (HCC)  Resolved Problems:    * No resolved hospital problems. *      Plan:  Following review of 's most recent history would appear that he most likely infarcted somewhere around 4/13/2021 and continues to display symptoms of angina and now heart failure with new changes from previous ECG 4/13/2021 and elevated proBNP. In review of the emergency room visit 4/13/2021 no blood chemistries were apparently documented for unknown reasons. Will obtain two-dimensional echocardiogram in a.m. and prepare for cardiac catheterization/PCI this Monday or sooner if clinically warranted. In the interim he will be placed on nitrates, aspirin, statin and beta-blocker. Will make further adjustments in medical regimen following two-dimensional echocardiographic findings.   Finally I have counseled him on smoking cessation and would strongly urged that he maintain his LDL cholesterol within updated 2020 ACC/AHA/AACE/ESC/EAS cholesterol guidelines. I have spent more than 45 minutes face to face with Richardson Shone reviewing notes and laboratory data with greater than 50% of this time instructing and counseling the patient regarding my findings and recommendations and I have answered all questions as posed to me by  Norm Alvarez. Thank you, Marian Aldrich MD for allowing me to consult in the care of this patient. Abdirizak Stewart DO, FACP, FACC, [facility]    NOTE:  This report was transcribed using voice recognition software. Every effort was made to ensure accuracy; however, inadvertent computerized transcription errors may be present.

## 2021-04-25 PROBLEM — I21.4 NSTEMI, INITIAL EPISODE OF CARE (HCC): Status: ACTIVE | Noted: 2021-04-25

## 2021-04-25 PROBLEM — I25.9 CHEST PAIN DUE TO MYOCARDIAL ISCHEMIA: Status: ACTIVE | Noted: 2021-04-25

## 2021-04-25 LAB
ALBUMIN SERPL-MCNC: 3.8 G/DL (ref 3.5–5.2)
ALP BLD-CCNC: 106 U/L (ref 40–129)
ALT SERPL-CCNC: 21 U/L (ref 0–40)
ANION GAP SERPL CALCULATED.3IONS-SCNC: 15 MMOL/L (ref 7–16)
AST SERPL-CCNC: 13 U/L (ref 0–39)
BASOPHILS ABSOLUTE: 0.08 E9/L (ref 0–0.2)
BASOPHILS RELATIVE PERCENT: 0.4 % (ref 0–2)
BILIRUB SERPL-MCNC: 0.4 MG/DL (ref 0–1.2)
BILIRUBIN DIRECT: <0.2 MG/DL (ref 0–0.3)
BILIRUBIN, INDIRECT: NORMAL MG/DL (ref 0–1)
BUN BLDV-MCNC: 13 MG/DL (ref 6–20)
C-REACTIVE PROTEIN: 1.6 MG/DL (ref 0–0.4)
CALCIUM SERPL-MCNC: 9.6 MG/DL (ref 8.6–10.2)
CHLORIDE BLD-SCNC: 100 MMOL/L (ref 98–107)
CHOLESTEROL, TOTAL: 175 MG/DL (ref 0–199)
CO2: 21 MMOL/L (ref 22–29)
CREAT SERPL-MCNC: 0.9 MG/DL (ref 0.7–1.2)
EOSINOPHILS ABSOLUTE: 0.1 E9/L (ref 0.05–0.5)
EOSINOPHILS RELATIVE PERCENT: 0.5 % (ref 0–6)
FOLATE: 6.5 NG/ML (ref 4.8–24.2)
GFR AFRICAN AMERICAN: >60
GFR NON-AFRICAN AMERICAN: >60 ML/MIN/1.73
GLUCOSE BLD-MCNC: 107 MG/DL (ref 74–99)
HCT VFR BLD CALC: 41.5 % (ref 37–54)
HDLC SERPL-MCNC: 28 MG/DL
HEMOGLOBIN: 14.5 G/DL (ref 12.5–16.5)
IMMATURE GRANULOCYTES #: 0.38 E9/L
IMMATURE GRANULOCYTES %: 1.8 % (ref 0–5)
LDL CHOLESTEROL CALCULATED: 120 MG/DL (ref 0–99)
LIPASE: 25 U/L (ref 13–60)
LV EF: 50 %
LVEF MODALITY: NORMAL
LYMPHOCYTES ABSOLUTE: 2.22 E9/L (ref 1.5–4)
LYMPHOCYTES RELATIVE PERCENT: 10.5 % (ref 20–42)
MAGNESIUM: 2.1 MG/DL (ref 1.6–2.6)
MCH RBC QN AUTO: 32.3 PG (ref 26–35)
MCHC RBC AUTO-ENTMCNC: 34.9 % (ref 32–34.5)
MCV RBC AUTO: 92.4 FL (ref 80–99.9)
MONOCYTES ABSOLUTE: 1.14 E9/L (ref 0.1–0.95)
MONOCYTES RELATIVE PERCENT: 5.4 % (ref 2–12)
NEUTROPHILS ABSOLUTE: 17.17 E9/L (ref 1.8–7.3)
NEUTROPHILS RELATIVE PERCENT: 81.4 % (ref 43–80)
PDW BLD-RTO: 11.9 FL (ref 11.5–15)
PHOSPHORUS: 4.2 MG/DL (ref 2.5–4.5)
PLATELET # BLD: 308 E9/L (ref 130–450)
PMV BLD AUTO: 11.1 FL (ref 7–12)
POTASSIUM SERPL-SCNC: 3.9 MMOL/L (ref 3.5–5)
PRO-BNP: 1826 PG/ML (ref 0–125)
RBC # BLD: 4.49 E12/L (ref 3.8–5.8)
SEDIMENTATION RATE, ERYTHROCYTE: 31 MM/HR (ref 0–15)
SODIUM BLD-SCNC: 136 MMOL/L (ref 132–146)
TOTAL PROTEIN: 6.9 G/DL (ref 6.4–8.3)
TRIGL SERPL-MCNC: 134 MG/DL (ref 0–149)
TSH SERPL DL<=0.05 MIU/L-ACNC: 0.87 UIU/ML (ref 0.27–4.2)
URIC ACID, SERUM: 6.3 MG/DL (ref 3.4–7)
VITAMIN B-12: 898 PG/ML (ref 211–946)
VLDLC SERPL CALC-MCNC: 27 MG/DL
WBC # BLD: 21.1 E9/L (ref 4.5–11.5)

## 2021-04-25 PROCEDURE — 85651 RBC SED RATE NONAUTOMATED: CPT

## 2021-04-25 PROCEDURE — 6360000004 HC RX CONTRAST MEDICATION: Performed by: INTERNAL MEDICINE

## 2021-04-25 PROCEDURE — 6370000000 HC RX 637 (ALT 250 FOR IP): Performed by: STUDENT IN AN ORGANIZED HEALTH CARE EDUCATION/TRAINING PROGRAM

## 2021-04-25 PROCEDURE — 82607 VITAMIN B-12: CPT

## 2021-04-25 PROCEDURE — 84443 ASSAY THYROID STIM HORMONE: CPT

## 2021-04-25 PROCEDURE — 6360000002 HC RX W HCPCS: Performed by: INTERNAL MEDICINE

## 2021-04-25 PROCEDURE — 84550 ASSAY OF BLOOD/URIC ACID: CPT

## 2021-04-25 PROCEDURE — 93306 TTE W/DOPPLER COMPLETE: CPT

## 2021-04-25 PROCEDURE — 82746 ASSAY OF FOLIC ACID SERUM: CPT

## 2021-04-25 PROCEDURE — 36415 COLL VENOUS BLD VENIPUNCTURE: CPT

## 2021-04-25 PROCEDURE — 80076 HEPATIC FUNCTION PANEL: CPT

## 2021-04-25 PROCEDURE — 80048 BASIC METABOLIC PNL TOTAL CA: CPT

## 2021-04-25 PROCEDURE — 6370000000 HC RX 637 (ALT 250 FOR IP): Performed by: INTERNAL MEDICINE

## 2021-04-25 PROCEDURE — 83735 ASSAY OF MAGNESIUM: CPT

## 2021-04-25 PROCEDURE — 85025 COMPLETE CBC W/AUTO DIFF WBC: CPT

## 2021-04-25 PROCEDURE — 83880 ASSAY OF NATRIURETIC PEPTIDE: CPT

## 2021-04-25 PROCEDURE — 2580000003 HC RX 258: Performed by: INTERNAL MEDICINE

## 2021-04-25 PROCEDURE — 84100 ASSAY OF PHOSPHORUS: CPT

## 2021-04-25 PROCEDURE — 86140 C-REACTIVE PROTEIN: CPT

## 2021-04-25 PROCEDURE — 2060000000 HC ICU INTERMEDIATE R&B

## 2021-04-25 PROCEDURE — 83690 ASSAY OF LIPASE: CPT

## 2021-04-25 PROCEDURE — 94640 AIRWAY INHALATION TREATMENT: CPT

## 2021-04-25 PROCEDURE — 80061 LIPID PANEL: CPT

## 2021-04-25 RX ORDER — PANTOPRAZOLE SODIUM 40 MG/1
40 TABLET, DELAYED RELEASE ORAL
Status: DISCONTINUED | OUTPATIENT
Start: 2021-04-25 | End: 2021-04-26 | Stop reason: HOSPADM

## 2021-04-25 RX ADMIN — ARFORMOTEROL TARTRATE 15 MCG: 15 SOLUTION RESPIRATORY (INHALATION) at 09:21

## 2021-04-25 RX ADMIN — SODIUM CHLORIDE, PRESERVATIVE FREE 10 ML: 5 INJECTION INTRAVENOUS at 04:57

## 2021-04-25 RX ADMIN — IPRATROPIUM BROMIDE AND ALBUTEROL SULFATE 1 AMPULE: .5; 3 SOLUTION RESPIRATORY (INHALATION) at 19:38

## 2021-04-25 RX ADMIN — PANTOPRAZOLE SODIUM 40 MG: 40 TABLET, DELAYED RELEASE ORAL at 16:55

## 2021-04-25 RX ADMIN — PANTOPRAZOLE SODIUM 40 MG: 40 TABLET, DELAYED RELEASE ORAL at 06:28

## 2021-04-25 RX ADMIN — ACETAMINOPHEN 650 MG: 325 TABLET ORAL at 23:37

## 2021-04-25 RX ADMIN — METOPROLOL TARTRATE 12.5 MG: 25 TABLET, FILM COATED ORAL at 20:57

## 2021-04-25 RX ADMIN — FENTANYL CITRATE 25 MCG: 50 INJECTION, SOLUTION INTRAMUSCULAR; INTRAVENOUS at 04:57

## 2021-04-25 RX ADMIN — NITROGLYCERIN 1 INCH: 20 OINTMENT TOPICAL at 16:55

## 2021-04-25 RX ADMIN — BUDESONIDE 250 MCG: 0.25 SUSPENSION RESPIRATORY (INHALATION) at 09:21

## 2021-04-25 RX ADMIN — IPRATROPIUM BROMIDE AND ALBUTEROL SULFATE 1 AMPULE: .5; 3 SOLUTION RESPIRATORY (INHALATION) at 15:30

## 2021-04-25 RX ADMIN — NITROGLYCERIN 1 INCH: 20 OINTMENT TOPICAL at 00:08

## 2021-04-25 RX ADMIN — BUDESONIDE 250 MCG: 0.25 SUSPENSION RESPIRATORY (INHALATION) at 19:38

## 2021-04-25 RX ADMIN — NITROGLYCERIN 1 INCH: 20 OINTMENT TOPICAL at 07:31

## 2021-04-25 RX ADMIN — ACETAMINOPHEN 650 MG: 325 TABLET ORAL at 16:59

## 2021-04-25 RX ADMIN — METOPROLOL TARTRATE 12.5 MG: 25 TABLET, FILM COATED ORAL at 08:04

## 2021-04-25 RX ADMIN — PERFLUTREN 1.65 MG: 6.52 INJECTION, SUSPENSION INTRAVENOUS at 08:03

## 2021-04-25 RX ADMIN — ARFORMOTEROL TARTRATE 15 MCG: 15 SOLUTION RESPIRATORY (INHALATION) at 19:38

## 2021-04-25 RX ADMIN — ENOXAPARIN SODIUM 40 MG: 40 INJECTION SUBCUTANEOUS at 08:06

## 2021-04-25 RX ADMIN — NITROGLYCERIN 1 INCH: 20 OINTMENT TOPICAL at 23:37

## 2021-04-25 RX ADMIN — IPRATROPIUM BROMIDE AND ALBUTEROL SULFATE 1 AMPULE: .5; 3 SOLUTION RESPIRATORY (INHALATION) at 12:41

## 2021-04-25 RX ADMIN — IPRATROPIUM BROMIDE AND ALBUTEROL SULFATE 1 AMPULE: .5; 3 SOLUTION RESPIRATORY (INHALATION) at 09:21

## 2021-04-25 RX ADMIN — ACETAMINOPHEN 650 MG: 325 TABLET ORAL at 08:11

## 2021-04-25 ASSESSMENT — PAIN DESCRIPTION - DESCRIPTORS
DESCRIPTORS: DULL
DESCRIPTORS: ACHING;DISCOMFORT

## 2021-04-25 ASSESSMENT — PAIN DESCRIPTION - FREQUENCY
FREQUENCY: INTERMITTENT
FREQUENCY: INTERMITTENT

## 2021-04-25 ASSESSMENT — PAIN SCALES - GENERAL
PAINLEVEL_OUTOF10: 3
PAINLEVEL_OUTOF10: 0
PAINLEVEL_OUTOF10: 9
PAINLEVEL_OUTOF10: 2
PAINLEVEL_OUTOF10: 7

## 2021-04-25 ASSESSMENT — PAIN DESCRIPTION - PROGRESSION
CLINICAL_PROGRESSION: GRADUALLY IMPROVING
CLINICAL_PROGRESSION: GRADUALLY WORSENING
CLINICAL_PROGRESSION: GRADUALLY WORSENING

## 2021-04-25 ASSESSMENT — PAIN DESCRIPTION - PAIN TYPE: TYPE: ACUTE PAIN

## 2021-04-25 ASSESSMENT — PAIN - FUNCTIONAL ASSESSMENT
PAIN_FUNCTIONAL_ASSESSMENT: PREVENTS OR INTERFERES SOME ACTIVE ACTIVITIES AND ADLS
PAIN_FUNCTIONAL_ASSESSMENT: PREVENTS OR INTERFERES SOME ACTIVE ACTIVITIES AND ADLS

## 2021-04-25 ASSESSMENT — PAIN DESCRIPTION - LOCATION: LOCATION: BACK

## 2021-04-25 ASSESSMENT — PAIN DESCRIPTION - ORIENTATION: ORIENTATION: MID

## 2021-04-25 ASSESSMENT — PAIN DESCRIPTION - ONSET: ONSET: GRADUAL

## 2021-04-25 NOTE — PLAN OF CARE
Problem: Cardiac:  Goal: Ability to maintain an adequate cardiac output will improve  Description: Ability to maintain an adequate cardiac output will improve  Outcome: Met This Shift  Goal: Hemodynamic stability will improve  Description: Hemodynamic stability will improve  Outcome: Met This Shift     Problem: Fluid Volume:  Goal: Ability to achieve and maintain adequate urine output will improve  Description: Ability to achieve and maintain adequate urine output will improve  Outcome: Met This Shift     Problem: Pain:  Goal: Control of acute pain  Description: Control of acute pain  Outcome: Met This Shift  Goal: Control of chronic pain  Description: Control of chronic pain  Outcome: Met This Shift     Problem: Respiratory:  Goal: Respiratory status will improve  Description: Respiratory status will improve  Outcome: Not Met This Shift     Problem: Pain:  Goal: Pain level will decrease  Description: Pain level will decrease  Outcome: Not Met This Shift

## 2021-04-25 NOTE — H&P
History and Physical      CHIEF COMPLAINT: Chest pain/burning, short of breath    History of Present Illness: 24-year-old male patient of Dr. Martínez Miranda am asked admit and follow. History obtained from patient and electronic record. Patient states approximately 2 weeks ago he was having left-sided burning in the chest associate with shortness of breath but no diaphoresis no radiation no nausea. He was seen in a different ED and discharged. Over the last 2 weeks symptoms would come and go worse with exertion better with rest.  Over the last 3 days symptoms became more intense he presented to the ED. In the ED troponin 0 0.41 with a proBNP of 1447. EKG revealed remote inferior infarct but no acute changes; frequent PVCs. He underwent CTA of chest which revealed no pulmonary embolus; there was mild cardiomegaly suspected interstitial edema. Ultrasound of gallbladder unremarkable. CT of abdomen with following results--    FINDINGS:   Lower Chest: In the lung bases there is bilateral septal thickening.  There   are bilateral posterior ground-glass airspace opacities which may be related   to edema or infectious process. Organs: There is diffuse low-attenuation of the liver, compatible with fatty   infiltration.  The gallbladder is present.  The spleen, pancreas, adrenal   glands, kidneys are unremarkable. Jerilynn Lencho is no hydronephrosis or urolithiasis. GI/Bowel: There is circumferential wall thickening and inflammation of the   duodenum, compatible with acute duodenitis.  No extraluminal air is   identified.  No bowel obstruction.  No appendiceal inflammation. Pelvis: Urinary bladder is unremarkable in CT appearance. Peritoneum/Retroperitoneum: No free fluid or free air.  Aorta is normal in   caliber. Bones/Soft Tissues: No acute abnormality. There are degenerative disc changes   at L4-5.       Impression:       Acute inflammatory changes of the duodenum, compatible with duodenitis. Bilateral lung base ground-glass airspace opacities and septal thickening can   be seen with edema.  Findings favored      Patient had cardiac catheterization and 2 stents inserted at [facility] in [facility] . Since that time he has had no further difficulties. At that time he was found to have hypertension was on medication short period of time eventually stop the medicine. He states he checks his blood pressure at home on nearly a daily basis never higher than 130. --Past medical history negative rheumatic fever scarlet fever polio diphtheria cancer diabetes; hypertension as above  --Past surgical history includes colonoscopy  as well as hemorrhoid surgery x2+ the above cardiac catheterization  --Patient began smoking approximately age 11 continues 1 pack a day; states he is try to quit many times but cannot. Extensive education regarding relationship between smoking and coronary disease. --Mother alive age 66 with \"chronic lung issues since she was young but no emphysema no cancer; father alive 68 healthy; paternal uncle  age 39 acute MI.  --He admits to occasional bronchitis/pneumonia perhaps once a year  --Recent indigestion no history of peptic ulcer  --Denies any kidney stones kidney infections      Past Medical History:   Diagnosis Date    Chest pain due to myocardial ischemia 2021    Coronary artery disease involving native coronary artery with unstable angina pectoris ([address]     H/O heart artery stent 2013    X2;  [facility]    Hypertension     NSTEMI, initial episode of care [facility]) 2021    Osteoarthritis cervical spine     Osteoarthritis of lumbar spine          Past Surgical History:   Procedure Laterality Date    CARDIAC CATHETERIZATION      [facility], [address] SURGERY      X2    PTCA  2013    X2, stents, [facility]       Medications Prior to Admission:    Medications Prior to Admission: aspirin 81 MG chewable tablet, Take 81 mg by mouth daily    Allergies:    Penicillins    Social History:    reports that he has been smoking cigarettes. He has a 33.00 pack-year smoking history. He has never used smokeless tobacco. He reports previous alcohol use. He reports that he does not use drugs. Family History:   family history includes Heart Attack in his paternal uncle; Liver Disease in his mother; Other in his father. REVIEW OF SYSTEMS:  As above in the HPI, otherwise negative    PHYSICAL EXAM:    VS: BP (!) 111/59   Pulse 86   Temp 98.4 °F (36.9 °C) (Oral)   Resp 18   Ht 5' 9\" (1.753 m)   Wt 249 lb 9 oz (113.2 kg)   SpO2 93%   BMI 36.85 kg/m²     General appearance: Alert, Awake, Oriented times 3, no distress  Skin: Warm and dry ; no rashes  Head: Normocephalic. No masses, lesions or tenderness noted  Eyes: Conjunctivae pink, sclera white. PERRL,EOM-I  Ears: External ears normal  Nose/Sinuses: Nares normal. Septum midline. Mucosa normal. No drainage  Oropharynx: Oropharynx clear with no exudate seen  Neck: Supple. No jugular venous distension, lymphadenopathy or thyromegaly Trachea midline  Lungs: Clear to auscultation bilaterally. No rhonchi, crackles or wheezes  Heart: S1 S2  Regular rate and rhythm. No rub, murmur or gallop  Abdomen: Soft, non-tender. BS normal. No masses, organomegaly; no rebound or guarding  Extremities: No edema, Peripheral pulses palpable  Musculoskeletal: Muscular strength appears intact. Neuro:  No focal motor defects ; II-XII grossly intact .  BATES equally  Breast: deferred  Rectal: deferred  Genitalia:  deferred    LABS:  CBC:   Lab Results   Component Value Date    WBC 21.1 04/25/2021    RBC 4.49 04/25/2021    HGB 14.5 04/25/2021    HCT 41.5 04/25/2021    MCV 92.4 04/25/2021    MCH 32.3 04/25/2021    MCHC 34.9 04/25/2021    RDW 11.9 04/25/2021     04/25/2021    MPV 11.1 04/25/2021     CBC with Differential: Lab Results   Component Value Date    WBC 21.1 04/25/2021    RBC 4.49 04/25/2021    HGB 14.5 04/25/2021    HCT 41.5 04/25/2021     04/25/2021    MCV 92.4 04/25/2021    MCH 32.3 04/25/2021    MCHC 34.9 04/25/2021    RDW 11.9 04/25/2021    LYMPHOPCT 10.5 04/25/2021    MONOPCT 5.4 04/25/2021    BASOPCT 0.4 04/25/2021    MONOSABS 1.14 04/25/2021    LYMPHSABS 2.22 04/25/2021    EOSABS 0.10 04/25/2021    BASOSABS 0.08 04/25/2021     Hemoglobin/Hematocrit:    Lab Results   Component Value Date    HGB 14.5 04/25/2021    HCT 41.5 04/25/2021     CMP:    Lab Results   Component Value Date     04/25/2021    K 3.9 04/25/2021    K 4.2 04/24/2021     04/25/2021    CO2 21 04/25/2021    BUN 13 04/25/2021    CREATININE 0.9 04/25/2021    GFRAA >60 04/25/2021    LABGLOM >60 04/25/2021    GLUCOSE 107 04/25/2021    PROT 6.9 04/25/2021    LABALBU 3.8 04/25/2021    CALCIUM 9.6 04/25/2021    BILITOT 0.4 04/25/2021    ALKPHOS 106 04/25/2021    AST 13 04/25/2021    ALT 21 04/25/2021     BMP:    Lab Results   Component Value Date     04/25/2021    K 3.9 04/25/2021    K 4.2 04/24/2021     04/25/2021    CO2 21 04/25/2021    BUN 13 04/25/2021    LABALBU 3.8 04/25/2021    CREATININE 0.9 04/25/2021    CALCIUM 9.6 04/25/2021    GFRAA >60 04/25/2021    LABGLOM >60 04/25/2021    GLUCOSE 107 04/25/2021     Hepatic Function Panel:    Lab Results   Component Value Date    ALKPHOS 106 04/25/2021    ALT 21 04/25/2021    AST 13 04/25/2021    PROT 6.9 04/25/2021    BILITOT 0.4 04/25/2021    BILIDIR <0.2 04/25/2021    IBILI see below 04/25/2021    LABALBU 3.8 04/25/2021     Ionized Calcium:  No results found for: IONCA  Magnesium:    Lab Results   Component Value Date    MG 2.1 04/25/2021     Phosphorus:    Lab Results   Component Value Date    PHOS 4.2 04/25/2021     LDH:  No results found for: LDH  Uric Acid:    Lab Results   Component Value Date    LABURIC 6.3 04/25/2021     PT/INR:  No results found for: PROTIME, INR  Warfarin PT/INR:  No components found for: Roy Prima  PTT:  No results found for: APTT, PTT[APTT}  Troponin:    Lab Results   Component Value Date    TROPONINI 0.32 04/24/2021     Last 3 Troponin:    Lab Results   Component Value Date    TROPONINI 0.32 04/24/2021    TROPONINI 0.29 04/24/2021    TROPONINI 0.41 04/24/2021     U/A:    Lab Results   Component Value Date    COLORU Yellow 04/24/2021    PROTEINU Negative 04/24/2021    PHUR 7.0 04/24/2021    WBCUA NONE 04/24/2021    RBCUA NONE 04/24/2021    BACTERIA NONE SEEN 04/24/2021    CLARITYU Clear 04/24/2021    SPECGRAV 1.020 04/24/2021    LEUKOCYTESUR Negative 04/24/2021    UROBILINOGEN 0.2 04/24/2021    BILIRUBINUR Negative 04/24/2021    BLOODU Negative 04/24/2021    GLUCOSEU Negative 04/24/2021     HgBA1c:    Lab Results   Component Value Date    LABA1C 5.1 04/24/2021     FLP:    Lab Results   Component Value Date    TRIG 134 04/25/2021    HDL 28 04/25/2021    LDLCALC 120 04/25/2021    LABVLDL 27 04/25/2021     TSH:    Lab Results   Component Value Date    TSH 0.869 04/25/2021     VITAMIN B12: No components found for: B12  FOLATE:    Lab Results   Component Value Date    FOLATE 6.5 04/25/2021       RADIOLOGY:  CTA PULMONARY W CONTRAST   Final Result   No evidence of pulmonary embolism. Mild cardiomegaly and suspect interstitial edema. CT ABDOMEN PELVIS WO CONTRAST Additional Contrast? None   Final Result   Acute inflammatory changes of the duodenum, compatible with duodenitis. Bilateral lung base ground-glass airspace opacities and septal thickening can   be seen with edema. Findings favored less likely to be related to pneumonia. Recommend radiographic follow-up. US GALLBLADDER RUQ   Final Result   There is no sonographic evidence of cholelithiasis or cholecystitis. No   biliary ductal dilatation.       Fatty infiltration of the liver         XR CHEST PORTABLE   Final Result   Shallow lung volumes with reticular airspace opacities at both lung bases. Findings are nonspecific and may be on the basis of atelectasis multifocal   pneumonia.              ASSESSMENT:      Active Hospital Problems    Diagnosis    Chest pain due to myocardial ischemia [I25.9]    NSTEMI, initial episode of care [facility]) [I21.4]    Coronary artery disease involving native coronary artery with unstable angina pectoris ([facility] [address] [I25.110]    Hypertension [I10]    Osteoarthritis cervical spine [M47.812]    Osteoarthritis of lumbar spine [E95.236]    Acute systolic (congestive) heart failure ([facility] [address] [I50.21]    H/O heart artery stent [Z95.5]       PLAN:  Medications discussed with patient  GI prophylaxis  DVT prophylaxis  Consultants notes reviewed  2D echo  Likely cardiac catheterization in a.m. if he remains stable  Aerosol treatments  Enoxaparin 40 mg daily  Metoprolol tartrate 12.5 mg twice daily  Nitroglycerin ointment 1 inch every 8 hours  Protonix increased to 40 mg twice daily  Tylenol as needed  Fentanyl 25 mcg 3 times daily as needed severe pain        Please note that over 50 minutes was spent in evaluating the patient, review of records and results, discussion with staff/family, etc.    [address]  11:43 AM  4/25/2021    Voice recognition software use for dictation

## 2021-04-26 ENCOUNTER — HOSPITAL ENCOUNTER (OUTPATIENT)
Age: 46
Setting detail: OBSERVATION
Discharge: HOME | End: 2021-04-27
Attending: INTERNAL MEDICINE | Admitting: INTERNAL MEDICINE
Payer: COMMERCIAL

## 2021-04-26 VITALS
BODY MASS INDEX: 36.93 KG/M2 | DIASTOLIC BLOOD PRESSURE: 63 MMHG | HEIGHT: 69 IN | RESPIRATION RATE: 20 BRPM | TEMPERATURE: 98.1 F | WEIGHT: 249.34 LBS | OXYGEN SATURATION: 95 % | SYSTOLIC BLOOD PRESSURE: 116 MMHG | HEART RATE: 67 BPM

## 2021-04-26 DIAGNOSIS — I25.10 CAD IN NATIVE ARTERY: ICD-10-CM

## 2021-04-26 LAB
ABO/RH: NORMAL
ALBUMIN SERPL-MCNC: 3.5 G/DL (ref 3.5–5.2)
ALP BLD-CCNC: 88 U/L (ref 40–129)
ALT SERPL-CCNC: 16 U/L (ref 0–40)
ANION GAP SERPL CALCULATED.3IONS-SCNC: 9 MMOL/L (ref 7–16)
ANTIBODY SCREEN: NORMAL
AST SERPL-CCNC: 13 U/L (ref 0–39)
BASOPHILS ABSOLUTE: 0.11 E9/L (ref 0–0.2)
BASOPHILS RELATIVE PERCENT: 1.3 % (ref 0–2)
BILIRUB SERPL-MCNC: 0.5 MG/DL (ref 0–1.2)
BILIRUBIN DIRECT: <0.2 MG/DL (ref 0–0.3)
BILIRUBIN, INDIRECT: ABNORMAL MG/DL (ref 0–1)
BUN BLDV-MCNC: 11 MG/DL (ref 6–20)
C-REACTIVE PROTEIN: 3.2 MG/DL (ref 0–0.4)
CALCIUM SERPL-MCNC: 9 MG/DL (ref 8.6–10.2)
CHLORIDE BLD-SCNC: 103 MMOL/L (ref 98–107)
CO2: 24 MMOL/L (ref 22–29)
CREAT SERPL-MCNC: 0.9 MG/DL (ref 0.7–1.2)
EOSINOPHILS ABSOLUTE: 0.17 E9/L (ref 0.05–0.5)
EOSINOPHILS RELATIVE PERCENT: 2.1 % (ref 0–6)
GFR AFRICAN AMERICAN: >60
GFR NON-AFRICAN AMERICAN: >60 ML/MIN/1.73
GLUCOSE BLD-MCNC: 108 MG/DL (ref 74–99)
HCT VFR BLD CALC: 38.6 % (ref 37–54)
HEMOGLOBIN: 13 G/DL (ref 12.5–16.5)
IMMATURE GRANULOCYTES #: 0.04 E9/L
IMMATURE GRANULOCYTES %: 0.5 % (ref 0–5)
LIPASE: 27 U/L (ref 13–60)
LYMPHOCYTES ABSOLUTE: 2.49 E9/L (ref 1.5–4)
LYMPHOCYTES RELATIVE PERCENT: 30.4 % (ref 20–42)
MAGNESIUM: 2.2 MG/DL (ref 1.6–2.6)
MCH RBC QN AUTO: 31.8 PG (ref 26–35)
MCHC RBC AUTO-ENTMCNC: 33.7 % (ref 32–34.5)
MCV RBC AUTO: 94.4 FL (ref 80–99.9)
MONOCYTES ABSOLUTE: 0.61 E9/L (ref 0.1–0.95)
MONOCYTES RELATIVE PERCENT: 7.4 % (ref 2–12)
NEUTROPHILS ABSOLUTE: 4.77 E9/L (ref 1.8–7.3)
NEUTROPHILS RELATIVE PERCENT: 58.3 % (ref 43–80)
PDW BLD-RTO: 12.3 FL (ref 11.5–15)
PHOSPHORUS: 4.2 MG/DL (ref 2.5–4.5)
PLATELET # BLD: 282 E9/L (ref 130–450)
PMV BLD AUTO: 11.1 FL (ref 7–12)
POTASSIUM SERPL-SCNC: 4.3 MMOL/L (ref 3.5–5)
RBC # BLD: 4.09 E12/L (ref 3.8–5.8)
SEDIMENTATION RATE, ERYTHROCYTE: 30 MM/HR (ref 0–15)
SODIUM BLD-SCNC: 136 MMOL/L (ref 132–146)
TOTAL PROTEIN: 6.1 G/DL (ref 6.4–8.3)
WBC # BLD: 8.2 E9/L (ref 4.5–11.5)

## 2021-04-26 PROCEDURE — 80048 BASIC METABOLIC PNL TOTAL CA: CPT

## 2021-04-26 PROCEDURE — 6370000000 HC RX 637 (ALT 250 FOR IP): Performed by: FAMILY MEDICINE

## 2021-04-26 PROCEDURE — 6370000000 HC RX 637 (ALT 250 FOR IP): Performed by: STUDENT IN AN ORGANIZED HEALTH CARE EDUCATION/TRAINING PROGRAM

## 2021-04-26 PROCEDURE — 6360000002 HC RX W HCPCS: Performed by: INTERNAL MEDICINE

## 2021-04-26 PROCEDURE — 93005 ELECTROCARDIOGRAM TRACING: CPT | Performed by: INTERNAL MEDICINE

## 2021-04-26 PROCEDURE — 2500000003 HC RX 250 WO HCPCS: Performed by: INTERNAL MEDICINE

## 2021-04-26 PROCEDURE — C1887 CATHETER, GUIDING: HCPCS

## 2021-04-26 PROCEDURE — C1769 GUIDE WIRE: HCPCS

## 2021-04-26 PROCEDURE — 6360000002 HC RX W HCPCS

## 2021-04-26 PROCEDURE — 96372 THER/PROPH/DIAG INJ SC/IM: CPT

## 2021-04-26 PROCEDURE — 6370000000 HC RX 637 (ALT 250 FOR IP)

## 2021-04-26 PROCEDURE — 93458 L HRT ARTERY/VENTRICLE ANGIO: CPT | Performed by: INTERNAL MEDICINE

## 2021-04-26 PROCEDURE — 2709999900 HC NON-CHARGEABLE SUPPLY

## 2021-04-26 PROCEDURE — 6370000000 HC RX 637 (ALT 250 FOR IP): Performed by: INTERNAL MEDICINE

## 2021-04-26 PROCEDURE — 86140 C-REACTIVE PROTEIN: CPT

## 2021-04-26 PROCEDURE — C1894 INTRO/SHEATH, NON-LASER: HCPCS

## 2021-04-26 PROCEDURE — 36415 COLL VENOUS BLD VENIPUNCTURE: CPT

## 2021-04-26 PROCEDURE — 84100 ASSAY OF PHOSPHORUS: CPT

## 2021-04-26 PROCEDURE — 80076 HEPATIC FUNCTION PANEL: CPT

## 2021-04-26 PROCEDURE — 85651 RBC SED RATE NONAUTOMATED: CPT

## 2021-04-26 PROCEDURE — 86900 BLOOD TYPING SEROLOGIC ABO: CPT

## 2021-04-26 PROCEDURE — 6360000002 HC RX W HCPCS: Performed by: FAMILY MEDICINE

## 2021-04-26 PROCEDURE — 2580000003 HC RX 258: Performed by: FAMILY MEDICINE

## 2021-04-26 PROCEDURE — C1874 STENT, COATED/COV W/DEL SYS: HCPCS

## 2021-04-26 PROCEDURE — 85025 COMPLETE CBC W/AUTO DIFF WBC: CPT

## 2021-04-26 PROCEDURE — 83690 ASSAY OF LIPASE: CPT

## 2021-04-26 PROCEDURE — G0378 HOSPITAL OBSERVATION PER HR: HCPCS

## 2021-04-26 PROCEDURE — 92928 PRQ TCAT PLMT NTRAC ST 1 LES: CPT | Performed by: INTERNAL MEDICINE

## 2021-04-26 PROCEDURE — 2500000003 HC RX 250 WO HCPCS

## 2021-04-26 PROCEDURE — 86850 RBC ANTIBODY SCREEN: CPT

## 2021-04-26 PROCEDURE — G0379 DIRECT REFER HOSPITAL OBSERV: HCPCS

## 2021-04-26 PROCEDURE — 94640 AIRWAY INHALATION TREATMENT: CPT

## 2021-04-26 PROCEDURE — 86901 BLOOD TYPING SEROLOGIC RH(D): CPT

## 2021-04-26 PROCEDURE — 83735 ASSAY OF MAGNESIUM: CPT

## 2021-04-26 PROCEDURE — C1725 CATH, TRANSLUMIN NON-LASER: HCPCS

## 2021-04-26 PROCEDURE — 96374 THER/PROPH/DIAG INJ IV PUSH: CPT

## 2021-04-26 PROCEDURE — C1760 CLOSURE DEV, VASC: HCPCS

## 2021-04-26 RX ORDER — ROSUVASTATIN CALCIUM 10 MG/1
10 TABLET, COATED ORAL NIGHTLY
Status: DISCONTINUED | OUTPATIENT
Start: 2021-04-26 | End: 2021-04-26 | Stop reason: HOSPADM

## 2021-04-26 RX ORDER — NICOTINE 21 MG/24HR
1 PATCH, TRANSDERMAL 24 HOURS TRANSDERMAL DAILY PRN
Status: DISCONTINUED | OUTPATIENT
Start: 2021-04-26 | End: 2021-04-26 | Stop reason: HOSPADM

## 2021-04-26 RX ORDER — POTASSIUM CHLORIDE 20 MEQ/1
40 TABLET, EXTENDED RELEASE ORAL PRN
Status: DISCONTINUED | OUTPATIENT
Start: 2021-04-26 | End: 2021-04-27 | Stop reason: HOSPADM

## 2021-04-26 RX ORDER — NICOTINE 21 MG/24HR
1 PATCH, TRANSDERMAL 24 HOURS TRANSDERMAL DAILY PRN
Qty: 30 PATCH | Refills: 3 | DISCHARGE
Start: 2021-04-26

## 2021-04-26 RX ORDER — ROSUVASTATIN CALCIUM 10 MG/1
10 TABLET, COATED ORAL NIGHTLY
Qty: 30 TABLET | Refills: 3 | Status: ON HOLD | DISCHARGE
Start: 2021-04-26 | End: 2021-04-27 | Stop reason: HOSPADM

## 2021-04-26 RX ORDER — POLYETHYLENE GLYCOL 3350 17 G/17G
17 POWDER, FOR SOLUTION ORAL DAILY PRN
Status: DISCONTINUED | OUTPATIENT
Start: 2021-04-26 | End: 2021-04-27 | Stop reason: HOSPADM

## 2021-04-26 RX ORDER — ACETAMINOPHEN 650 MG/1
650 SUPPOSITORY RECTAL EVERY 6 HOURS PRN
Status: DISCONTINUED | OUTPATIENT
Start: 2021-04-26 | End: 2021-04-27 | Stop reason: HOSPADM

## 2021-04-26 RX ORDER — ASPIRIN 81 MG/1
243 TABLET, CHEWABLE ORAL DAILY
Status: DISCONTINUED | OUTPATIENT
Start: 2021-04-26 | End: 2021-04-26

## 2021-04-26 RX ORDER — PANTOPRAZOLE SODIUM 40 MG/1
40 TABLET, DELAYED RELEASE ORAL
Status: DISCONTINUED | OUTPATIENT
Start: 2021-04-27 | End: 2021-04-27 | Stop reason: HOSPADM

## 2021-04-26 RX ORDER — ASPIRIN 81 MG/1
81 TABLET, CHEWABLE ORAL DAILY
Status: DISCONTINUED | OUTPATIENT
Start: 2021-04-26 | End: 2021-04-26 | Stop reason: HOSPADM

## 2021-04-26 RX ORDER — POTASSIUM CHLORIDE 7.45 MG/ML
10 INJECTION INTRAVENOUS PRN
Status: DISCONTINUED | OUTPATIENT
Start: 2021-04-26 | End: 2021-04-27 | Stop reason: HOSPADM

## 2021-04-26 RX ORDER — SODIUM CHLORIDE 0.9 % (FLUSH) 0.9 %
5-40 SYRINGE (ML) INJECTION EVERY 12 HOURS SCHEDULED
Status: DISCONTINUED | OUTPATIENT
Start: 2021-04-26 | End: 2021-04-27 | Stop reason: SDUPTHER

## 2021-04-26 RX ORDER — ASPIRIN 81 MG/1
243 TABLET, CHEWABLE ORAL ONCE
Status: COMPLETED | OUTPATIENT
Start: 2021-04-26 | End: 2021-04-26

## 2021-04-26 RX ORDER — ROSUVASTATIN CALCIUM 20 MG/1
10 TABLET, COATED ORAL NIGHTLY
Status: DISCONTINUED | OUTPATIENT
Start: 2021-04-26 | End: 2021-04-27

## 2021-04-26 RX ORDER — ASPIRIN 81 MG/1
81 TABLET, CHEWABLE ORAL DAILY
Status: DISCONTINUED | OUTPATIENT
Start: 2021-04-26 | End: 2021-04-27 | Stop reason: HOSPADM

## 2021-04-26 RX ORDER — PANTOPRAZOLE SODIUM 40 MG/1
40 TABLET, DELAYED RELEASE ORAL
Qty: 30 TABLET | Refills: 3 | DISCHARGE
Start: 2021-04-26

## 2021-04-26 RX ORDER — ACETAMINOPHEN 325 MG/1
650 TABLET ORAL EVERY 6 HOURS PRN
Status: DISCONTINUED | OUTPATIENT
Start: 2021-04-26 | End: 2021-04-27 | Stop reason: HOSPADM

## 2021-04-26 RX ORDER — SODIUM CHLORIDE 0.9 % (FLUSH) 0.9 %
10 SYRINGE (ML) INJECTION PRN
Status: DISCONTINUED | OUTPATIENT
Start: 2021-04-26 | End: 2021-04-27 | Stop reason: HOSPADM

## 2021-04-26 RX ORDER — BUMETANIDE 0.25 MG/ML
0.5 INJECTION, SOLUTION INTRAMUSCULAR; INTRAVENOUS ONCE
Status: COMPLETED | OUTPATIENT
Start: 2021-04-26 | End: 2021-04-26

## 2021-04-26 RX ORDER — NICOTINE 21 MG/24HR
1 PATCH, TRANSDERMAL 24 HOURS TRANSDERMAL DAILY PRN
Status: DISCONTINUED | OUTPATIENT
Start: 2021-04-26 | End: 2021-04-27 | Stop reason: HOSPADM

## 2021-04-26 RX ORDER — SODIUM CHLORIDE 9 MG/ML
25 INJECTION, SOLUTION INTRAVENOUS PRN
Status: DISCONTINUED | OUTPATIENT
Start: 2021-04-26 | End: 2021-04-27 | Stop reason: SDUPTHER

## 2021-04-26 RX ADMIN — BUMETANIDE 0.5 MG: 0.25 INJECTION INTRAMUSCULAR; INTRAVENOUS at 17:38

## 2021-04-26 RX ADMIN — ARFORMOTEROL TARTRATE 15 MCG: 15 SOLUTION RESPIRATORY (INHALATION) at 07:53

## 2021-04-26 RX ADMIN — BUDESONIDE 250 MCG: 0.25 SUSPENSION RESPIRATORY (INHALATION) at 07:53

## 2021-04-26 RX ADMIN — METOPROLOL TARTRATE 12.5 MG: 25 TABLET, FILM COATED ORAL at 20:24

## 2021-04-26 RX ADMIN — PANTOPRAZOLE SODIUM 40 MG: 40 TABLET, DELAYED RELEASE ORAL at 06:34

## 2021-04-26 RX ADMIN — IPRATROPIUM BROMIDE AND ALBUTEROL SULFATE 1 AMPULE: .5; 3 SOLUTION RESPIRATORY (INHALATION) at 07:53

## 2021-04-26 RX ADMIN — IPRATROPIUM BROMIDE AND ALBUTEROL SULFATE 1 AMPULE: .5; 3 SOLUTION RESPIRATORY (INHALATION) at 11:24

## 2021-04-26 RX ADMIN — ACETAMINOPHEN 650 MG: 325 TABLET ORAL at 20:24

## 2021-04-26 RX ADMIN — NITROGLYCERIN 1 INCH: 20 OINTMENT TOPICAL at 06:35

## 2021-04-26 RX ADMIN — ROSUVASTATIN 10 MG: 20 TABLET, FILM COATED ORAL at 20:25

## 2021-04-26 RX ADMIN — ASPIRIN 243 MG: 81 TABLET, CHEWABLE ORAL at 12:46

## 2021-04-26 RX ADMIN — METOPROLOL TARTRATE 12.5 MG: 25 TABLET, FILM COATED ORAL at 08:32

## 2021-04-26 RX ADMIN — NITROGLYCERIN 1 INCH: 20 OINTMENT TOPICAL at 19:02

## 2021-04-26 RX ADMIN — ASPIRIN 81 MG: 81 TABLET, CHEWABLE ORAL at 12:00

## 2021-04-26 RX ADMIN — ACETAMINOPHEN 650 MG: 325 TABLET ORAL at 06:35

## 2021-04-26 RX ADMIN — ENOXAPARIN SODIUM 40 MG: 40 INJECTION, SOLUTION INTRAVENOUS; SUBCUTANEOUS at 19:02

## 2021-04-26 RX ADMIN — SODIUM CHLORIDE, PRESERVATIVE FREE 10 ML: 5 INJECTION INTRAVENOUS at 20:25

## 2021-04-26 ASSESSMENT — PAIN SCALES - GENERAL
PAINLEVEL_OUTOF10: 0
PAINLEVEL_OUTOF10: 3

## 2021-04-26 NOTE — CARE COORDINATION
Social Work/Discharge Planning:  Received notification patient to have a cardiac catheterization at [place] [id number].  Ambulance form completed and placed in soft chart. Will continue to follow.   Electronically signed by CLIFF Renee on 4/26/2021 at 9:41 AM

## 2021-04-26 NOTE — PROGRESS NOTES
PROGRESS NOTE       PATIENT PROBLEM LIST:  Principal Problem:    NSTEMI, initial episode of care [facility])  Active Problems:    Acute systolic (congestive) heart failure (HCC)    Chest pain due to myocardial ischemia    Coronary artery disease involving native coronary artery with unstable angina pectoris ([address]    Hypertension    H/O heart artery stent    Osteoarthritis cervical spine    Osteoarthritis of lumbar spine  Resolved Problems:    * No resolved hospital problems. *      SUBJECTIVE:  Vicki Schuler states he feels somewhat better today with only mild discomfort earlier this morning. No significant shortness of breath and his abdominal discomfort has improved as well. REVIEW OF SYSTEMS:  General ROS: positive for - fatigue  Psychological ROS: negative for - anxiety , depression  Ophthalmic ROS: negative for - decreased vision or visual distortion. ENT ROS: negative  Allergy and Immunology ROS: negative  Hematological and Lymphatic ROS: negative  Endocrine: no heat or cold intolerance and no polyphagia, polydipsia, or polyuria  Respiratory ROS: positive for - shortness of breath  Cardiovascular ROS: positive for - chest pain and dyspnea on exertion. Gastrointestinal ROS: no abdominal pain, change in bowel habits, or black or bloody stools  Genito-Urinary ROS: no nocturia, dysuria, trouble voiding, frequency or hematuria  Musculoskeletal ROS: negative for- myalgias, arthralgias, or claudication  Neurological ROS: no TIA or stroke symptoms otherwise no significant change in symptoms or problems since yesterday as documented in previous progress notes.     SCHEDULED MEDICATIONS:   aspirin  81 mg Oral Daily    rosuvastatin  10 mg Oral Nightly    pantoprazole  40 mg Oral BID AC    Arformoterol Tartrate  15 mcg Nebulization BID    budesonide  250 mcg Nebulization BID    enoxaparin  40 mg Subcutaneous Daily    ipratropium-albuterol  1 ampule Inhalation Q4H WA    nitroglycerin  1 inch Topical Q8H    metoprolol tartrate  12.5 mg Oral BID       VITAL SIGNS:                                                                                                                          /63   Pulse 67   Temp 98.1 °F (36.7 °C) (Oral)   Resp 20   Ht 5' 9\" (1.753 m)   Wt 249 lb 5.4 oz (113.1 kg)   SpO2 95%   BMI 36.82 kg/m²   Patient Vitals for the past 96 hrs (Last 3 readings):   Weight   04/26/21 0628 249 lb 5.4 oz (113.1 kg)   04/25/21 0505 249 lb 9 oz (113.2 kg)   04/24/21 1647 247 lb 8 oz (112.3 kg)     OBJECTIVE:    HEENT: PERRL, EOM  Intact; sclera non-icteric, conjunctiva pink. Carotids are brisk in upstroke with normal contour. No carotid bruits. Normal jugular venous pulsation at 45°. No palpable cervical nor supraclavicular nodes. Thyroid not palpable. Trachea midline. Chest: Even excursion  Lungs: Grossly clear to auscultation bilaterally, no expiratory wheezes or rhonchi, no decreased tactile fremitus without inspiratory rales. Heart: Regular  rhythm; S1 > S2, no gallop or murmur. No clicks, rub, palpable thrills   or heaves. PMI nondisplaced, 5th intercostal space MCL. Abdomen: Soft, nontender, nondistended,  moderately protuberant, no masses or organomegaly. Bowel sounds active. Extremities: Without clubbing, cyanosis or edema. Pulses present 3+ upper extermities bilaterally; present 2+ DP and present 2+ PT bilaterally.      Data:   Scheduled Meds: Reviewed  Continuous Infusions:     Intake/Output Summary (Last 24 hours) at 4/26/2021 1222  Last data filed at 4/26/2021 0801  Gross per 24 hour   Intake 660 ml   Output 2600 ml   Net -1940 ml     CBC:   Recent Labs     04/24/21  0818 04/25/21  0357 04/26/21  0339   WBC 9.7 21.1* 8.2   HGB 14.7 14.5 13.0   HCT 41.5 41.5 38.6    308 282     BMP:  Recent Labs     04/24/21  0818 04/25/21  0357 04/26/21  0339    136 136   K 4.2 3.9 4.3    100 103   CO2 26 21* 24   BUN 10 13 11   CREATININE 0.9 0.9 0.9   LABGLOM >60 >60 >60     ABGs: No results found for: PH, PO2, PCO2  INR: No results for input(s): INR in the last 72 hours. PRO-BNP:   Lab Results   Component Value Date    PROBNP 1,826 (H) 04/25/2021    PROBNP 1,447 (H) 04/24/2021      TSH:   Lab Results   Component Value Date    TSH 0.869 04/25/2021      Cardiac Injury Profile:   Recent Labs     04/24/21  0818 04/24/21  1719 04/24/21  2328   TROPONINI 0.41* 0.29* 0.32*      Lipid Profile:   Lab Results   Component Value Date    TRIG 134 04/25/2021    HDL 28 04/25/2021    LDLCALC 120 04/25/2021    CHOL 175 04/25/2021      Hemoglobin A1C: No components found for: HGBA1C     RAD:   Echo Complete    Result Date: 4/25/2021  Transthoracic Echocardiography Report (TTE)  Demographics   Patient Name        Melita Saenz   Gender                Male   Medical Record      [id number]    Room Number           6932  Number   Account #           [de-identified]   Procedure Date        04/25/2021   Corporate ID                    Ordering Physician    Marbella Meier   Accession Number    [id number]  Referring Physician   Megan Ziegler   Date of Birth       1975  Sonographer           Rich Najera [facility]   Age                 50 year(s)  Interpreting          Ji Staana maria DO                                  Physician                                   Any Other  Procedure Type of Study   TTE procedure:Echo Complete W/Doppler & Color Flow. Procedure Date Date: 04/25/2021 Start: 07:40 AM Study Location: Portable Technical Quality: Poor visualization due to body habitus. Indications:Congestive heart failure. Patient Status: Routine Contrast Medium: Definity. Height: 69 inches Weight: 247 pounds BSA: 2.26 m^2 BMI: 36.48 kg/m^2 HR: 106 bpm BP: 112/68 mmHg  Findings   Left Ventricle  Left ventricle mildly dilated. Inferolateral hypokinesis moderate . Lateral wall hypokinesis, mild. Estimated left ventricular ejection fraction is 50±5%. Cannot accurately characterize diastolic function secondary to mitral  valve disease.    Right LVOT VTI: 24.3 cm    PV Peak Velocity: 1.28 m/s  MV E' Lateral Velocity: 11 IVRT: 50.7 msec      PV Peak Gradient: 6.56  m/s                        Estimated PASP:      mmHg  MR Velocity: 4.89 m/s      50.69 mmHg           PV Mean Velocity: 0.85 m/s  MV ZENIA PISA: 0.13 cm^2                          PV Mean Gradient: 3.3 mmHg  MR VTI: 128.5 cm  Alias Velocity: 0.21  m/sPISA Radius: 0.7 cm   PISA area: 3.08 cm^2MR  flow rate: 63.14 ml/sMR  volume:16.7 ml  [email or web address] [id number]    Ct Abdomen Pelvis Wo Contrast Additional Contrast? None    Result Date: 4/24/2021  EXAMINATION: CT OF THE ABDOMEN AND PELVIS WITHOUT CONTRAST 4/24/2021 10:54 am TECHNIQUE: CT of the abdomen and pelvis was performed without the administration of intravenous contrast. Multiplanar reformatted images are provided for review. Dose modulation, iterative reconstruction, and/or weight based adjustment of the mA/kV was utilized to reduce the radiation dose to as low as reasonably achievable. COMPARISON: CT a chest 04/24/2021 HISTORY: ORDERING SYSTEM PROVIDED HISTORY: general abd pain TECHNOLOGIST PROVIDED HISTORY: Reason for exam:->general abd pain Additional Contrast?->None Decision Support Exception->Emergency Medical Condition (MA) FINDINGS: Lower Chest: In the lung bases there is bilateral septal thickening. There are bilateral posterior ground-glass airspace opacities which may be related to edema or infectious process. Organs: There is diffuse low-attenuation of the liver, compatible with fatty infiltration. The gallbladder is present. The spleen, pancreas, adrenal glands, kidneys are unremarkable. There is no hydronephrosis or urolithiasis. GI/Bowel: There is circumferential wall thickening and inflammation of the duodenum, compatible with acute duodenitis. No extraluminal air is identified. No bowel obstruction.   No appendiceal inflammation. Pelvis: Urinary bladder is unremarkable in CT appearance. Peritoneum/Retroperitoneum: No free fluid or free air. Aorta is normal in caliber. Bones/Soft Tissues: No acute abnormality. There are degenerative disc changes at L4-5. Acute inflammatory changes of the duodenum, compatible with duodenitis. Bilateral lung base ground-glass airspace opacities and septal thickening can be seen with edema. Findings favored less likely to be related to pneumonia. Recommend radiographic follow-up. Us Gallbladder Ruq    Result Date: 4/24/2021  EXAMINATION: RIGHT UPPER QUADRANT ULTRASOUND 4/24/2021 9:11 am COMPARISON: None. HISTORY: ORDERING SYSTEM PROVIDED HISTORY: PAIN TECHNOLOGIST PROVIDED HISTORY: Reason for exam:->PAIN What reading provider will be dictating this exam?->CRC FINDINGS: LIVER:  There is increased echogenicity of the liver, which can be seen with fatty infiltration. Liver measures up to 19.8 cm craniocaudal. BILIARY SYSTEM:  There is no evidence of cholelithiasis or gallbladder wall thickening. Common bile duct is within normal limits measuring 5-6 mm. RIGHT KIDNEY: The right kidney is grossly unremarkable without evidence of hydronephrosis. PANCREAS: Not well visualized OTHER: No evidence of right upper quadrant ascites. There is no sonographic evidence of cholelithiasis or cholecystitis. No biliary ductal dilatation. Fatty infiltration of the liver     Xr Chest Portable    Result Date: 4/24/2021  EXAMINATION: ONE XRAY VIEW OF THE CHEST 4/24/2021 8:40 am COMPARISON: None. HISTORY: ORDERING SYSTEM PROVIDED HISTORY: SOB TECHNOLOGIST PROVIDED HISTORY: Reason for exam:->SOB FINDINGS: Lung volumes are shallow and there is elevation of the left hemidiaphragm. There are faint linear/reticular airspace opacities at both lung bases. No evidence of a sizable pleural effusion. No pneumothorax.   Heart size is unable to be accurately assessed on this single portable view of the chest. No acute osseous abnormality. Shallow lung volumes with reticular airspace opacities at both lung bases. Findings are nonspecific and may be on the basis of atelectasis multifocal pneumonia. Cta Pulmonary W Contrast    Result Date: 4/24/2021  EXAMINATION: CTA OF THE CHEST 4/24/2021 11:54 am TECHNIQUE: CTA of the chest was performed after the administration of intravenous contrast.  Multiplanar reformatted images are provided for review. MIP images are provided for review. Dose modulation, iterative reconstruction, and/or weight based adjustment of the mA/kV was utilized to reduce the radiation dose to as low as reasonably achievable. COMPARISON: None. HISTORY: ORDERING SYSTEM PROVIDED HISTORY: possible PE TECHNOLOGIST PROVIDED HISTORY: Reason for exam:->possible PE Decision Support Exception->Emergency Medical Condition (MA) FINDINGS: Pulmonary Arteries: Pulmonary arteries are adequately opacified for evaluation. No evidence of intraluminal filling defect to suggest pulmonary embolism. Main pulmonary artery is normal in caliber. Mediastinum: No evidence of mediastinal lymphadenopathy. The heart and pericardium demonstrate no acute abnormality. There is no acute abnormality of the thoracic aorta. Lungs/pleura: There are mild hazy opacity in both lungs with mild infiltrates at the posterior lung bases. No evidence of pleural effusion or pneumothorax. Upper Abdomen: Limited images of the upper abdomen are unremarkable. Soft Tissues/Bones: No acute bone or soft tissue abnormality. No evidence of pulmonary embolism. Mild cardiomegaly and suspect interstitial edema.        EKG: See Report  Echo: See Report      IMPRESSIONS:  Principal Problem:    NSTEMI, initial episode of care [facility])  Active Problems:    Acute systolic (congestive) heart failure (HCC)    Chest pain due to myocardial ischemia    Coronary artery disease involving native coronary artery with unstable angina pectoris ([address]    Hypertension H/O heart artery stent    Osteoarthritis cervical spine    Osteoarthritis of lumbar spine  Resolved Problems:    * No resolved hospital problems. *      RECOMMENDATIONS:  I have informed Mr. Sherley Cunningham is to undergo cardiac catheterization with potential PCI this afternoon but that I am not in control of the schedule unfortunately. I have once again answered all questions related to his procedure and he is agreed to proceed as scheduled. Maintain present medical regimen and will make appropriate adjustments post cardiac catheterization/PCI. I have spent more than 25 minutes face to face with Tien Hampton and reviewing notes and laboratory data, with greater than 50% of this time instructing and counseling the patient face to face regarding my findings and recommendations and I have answered all questions as posed to me by Mr. Sherley Cunningham. Francisco Javier Lyman, DO FACP,FACC,FSCAI      NOTE:  This report was transcribed using voice recognition software.   Every effort was made to ensure accuracy; however, inadvertent computerized transcription errors may be present

## 2021-04-26 NOTE — PLAN OF CARE
Problem: Cardiac:  Goal: Ability to maintain an adequate cardiac output will improve  Description: Ability to maintain an adequate cardiac output will improve  Outcome: Met This Shift  Goal: Hemodynamic stability will improve  Description: Hemodynamic stability will improve  Outcome: Met This Shift     Problem: Fluid Volume:  Goal: Ability to achieve and maintain adequate urine output will improve  Description: Ability to achieve and maintain adequate urine output will improve  Outcome: Met This Shift     Problem: Respiratory:  Goal: Respiratory status will improve  Description: Respiratory status will improve  Outcome: Met This Shift     Problem: Pain:  Goal: Pain level will decrease  Description: Pain level will decrease  Outcome: Met This Shift  Goal: Control of acute pain  Description: Control of acute pain  Outcome: Met This Shift  Goal: Control of chronic pain  Description: Control of chronic pain  Outcome: Met This Shift

## 2021-04-26 NOTE — PROGRESS NOTES
PROGRESS  NOTE --                                                          INTERNAL  MEDICINE                                                                              I  PERSONALLY SAW , EXAMINED, AND CARED FOR  Milagros Herrera TODAY, 4/26/2021     LABS, XRAY ,CHART, AND MEDICATIONS  REVIEWED BY ME       Chief complaint: Chest pain, chest burning, short of breath      /20 6/2021-SUBJECTIVE: Dany King is alert awake and cooperative; oriented ×3. Denies any  dyspnea nausea emesis. No significant chest burning or pain overnight. Tolerating diet. Less epigastric discomfort. Scheduled for cardiac catheterization this afternoon; will transfer to [facility]. Patient having no nicotine withdrawal symptoms; does not think he needs nicotine patch at this time. Afebrile last 24 hours. Blood pressure 116/63.  95% saturation on room air. Intake and output -3425 cc. Glucose 108 . Liver functions normal.  A1c 5.1.  TSH 0.869. Hemoglobin 13.0 WBC 8.2. Platelets 508. The last troponin, 0.32. CRP 3.2. B57 folic acid normal.  Sed rate 30. Blood cultures negative to date. Urine culture no growth still incubating. SARS-CoV-2 not detected.         Objective:     PHYSICAL EXAM:    VS: /63   Pulse 67   Temp 98.1 °F (36.7 °C) (Oral)   Resp 20   Ht 5' 9\" (1.753 m)   Wt 249 lb 5.4 oz (113.1 kg)   SpO2 95%   BMI 36.82 kg/m²     Labs:   CBC:   Lab Results   Component Value Date    WBC 8.2 04/26/2021    RBC 4.09 04/26/2021    HGB 13.0 04/26/2021    HCT 38.6 04/26/2021    MCV 94.4 04/26/2021    MCH 31.8 04/26/2021    MCHC 33.7 04/26/2021    RDW 12.3 04/26/2021     04/26/2021    MPV 11.1 04/26/2021     CBC with Differential:    Lab Results   Component Value Date    WBC 8.2 04/26/2021    RBC 4.09 04/26/2021    HGB 13.0 04/26/2021    HCT 38.6 04/26/2021     04/26/2021    MCV 94.4 04/26/2021    MCH 31.8 04/26/2021    MCHC 33.7 04/26/2021    RDW 12.3 04/26/2021    LYMPHOPCT 30.4 04/26/2021    MONOPCT 7.4 04/26/2021    BASOPCT 1.3 04/26/2021    MONOSABS 0.61 04/26/2021    LYMPHSABS 2.49 04/26/2021    EOSABS 0.17 04/26/2021    BASOSABS 0.11 04/26/2021     Hemoglobin/Hematocrit:    Lab Results   Component Value Date    HGB 13.0 04/26/2021    HCT 38.6 04/26/2021     CMP:    Lab Results   Component Value Date     04/26/2021    K 4.3 04/26/2021    K 4.2 04/24/2021     04/26/2021    CO2 24 04/26/2021    BUN 11 04/26/2021    CREATININE 0.9 04/26/2021    GFRAA >60 04/26/2021    LABGLOM >60 04/26/2021    GLUCOSE 108 04/26/2021    PROT 6.1 04/26/2021    LABALBU 3.5 04/26/2021    CALCIUM 9.0 04/26/2021    BILITOT 0.5 04/26/2021    ALKPHOS 88 04/26/2021    AST 13 04/26/2021    ALT 16 04/26/2021     BMP:    Lab Results   Component Value Date     04/26/2021    K 4.3 04/26/2021    K 4.2 04/24/2021     04/26/2021    CO2 24 04/26/2021    BUN 11 04/26/2021    LABALBU 3.5 04/26/2021    CREATININE 0.9 04/26/2021    CALCIUM 9.0 04/26/2021    GFRAA >60 04/26/2021    LABGLOM >60 04/26/2021    GLUCOSE 108 04/26/2021     Hepatic Function Panel:    Lab Results   Component Value Date    ALKPHOS 88 04/26/2021    ALT 16 04/26/2021    AST 13 04/26/2021    PROT 6.1 04/26/2021    BILITOT 0.5 04/26/2021    BILIDIR <0.2 04/26/2021    IBILI see below 04/26/2021    LABALBU 3.5 04/26/2021     Ionized Calcium:  No results found for: IONCA  Magnesium:    Lab Results   Component Value Date    MG 2.2 04/26/2021     Phosphorus:    Lab Results   Component Value Date    PHOS 4.2 04/26/2021     LDH:  No results found for: LDH  Uric Acid:    Lab Results   Component Value Date    LABURIC 6.3 04/25/2021     PT/INR:  No results found for: PROTIME, INR  Warfarin PT/INR:  No components found for: PTPATWAR, PTINRWAR  PTT:  No results found for: APTT, PTT[APTT}  Troponin:    Lab Results   Component Value Date    TROPONINI 0.32 04/24/2021 Last 3 Troponin:    Lab Results   Component Value Date    TROPONINI 0.32 04/24/2021    TROPONINI 0.29 04/24/2021    TROPONINI 0.41 04/24/2021     U/A:    Lab Results   Component Value Date    COLORU Yellow 04/24/2021    PROTEINU Negative 04/24/2021    PHUR 7.0 04/24/2021    WBCUA NONE 04/24/2021    RBCUA NONE 04/24/2021    BACTERIA NONE SEEN 04/24/2021    CLARITYU Clear 04/24/2021    SPECGRAV 1.020 04/24/2021    LEUKOCYTESUR Negative 04/24/2021    UROBILINOGEN 0.2 04/24/2021    BILIRUBINUR Negative 04/24/2021    BLOODU Negative 04/24/2021    GLUCOSEU Negative 04/24/2021     HgBA1c:    Lab Results   Component Value Date    LABA1C 5.1 04/24/2021     FLP:    Lab Results   Component Value Date    TRIG 134 04/25/2021    HDL 28 04/25/2021    LDLCALC 120 04/25/2021    LABVLDL 27 04/25/2021     TSH:    Lab Results   Component Value Date    TSH 0.869 04/25/2021     VITAMIN B12: No components found for: B12  FOLATE:    Lab Results   Component Value Date    FOLATE 6.5 04/25/2021        General appearance: Alert, Awake, Oriented times 3, no distress  Skin: Warm and dry ; no rashes  Head: Normocephalic. No masses, lesions or tenderness noted  Eyes: Conjunctivae pink, sclera white. PERRL,EOM-I  Ears: External ears normal  Nose/Sinuses: Nares normal. Septum midline. Mucosa normal. No drainage  Oropharynx: Oropharynx clear with no exudate seen  Neck: Supple. No jugular venous distension, lymphadenopathy or thyromegaly Trachea midline  Lungs: Clear to auscultation bilaterally. No rhonchi, crackles or wheezes  Heart: S1 S2  Regular rate and rhythm. No rub, murmur or gallop  Abdomen: Soft, equivocal epigastric tenderness to palpation. BS normal. No masses, organomegaly; no rebound or guarding  Extremities: No edema, Peripheral pulses palpable  Musculoskeletal: Muscular strength appears intact. Neuro:  No focal motor defects ; II-XII grossly intact .  BATES equally    TELEMETRY: REVIEWED--Telemetry: Sinus    ASSESSMENT:   Principal 09/15/20 245 lb (111.1 kg)       Labs: Additional    GLUCOSE:No results for input(s): POCGLU in the last 72 hours. BNP:No results found for: BNP    CRP:   Recent Labs     04/25/21  0357 04/26/21  0339   CRP 1.6* 3.2*       ESR:  Recent Labs     04/25/21  0357 04/26/21  0339   SEDRATE 31* 30*       RADIOLOGY: REVIEWED AVAILABLE REPORT  CTA PULMONARY W CONTRAST   Final Result   No evidence of pulmonary embolism. Mild cardiomegaly and suspect interstitial edema. CT ABDOMEN PELVIS WO CONTRAST Additional Contrast? None   Final Result   Acute inflammatory changes of the duodenum, compatible with duodenitis. Bilateral lung base ground-glass airspace opacities and septal thickening can   be seen with edema. Findings favored less likely to be related to pneumonia. Recommend radiographic follow-up. US GALLBLADDER RUQ   Final Result   There is no sonographic evidence of cholelithiasis or cholecystitis. No   biliary ductal dilatation. Fatty infiltration of the liver         XR CHEST PORTABLE   Final Result   Shallow lung volumes with reticular airspace opacities at both lung bases. Findings are nonspecific and may be on the basis of atelectasis multifocal   pneumonia.                    Arlet Vazquez DO   11:43 AM     4/26/2021      Voice recognition software used for dictation

## 2021-04-26 NOTE — PROGRESS NOTES
PROGRESS NOTE       PATIENT PROBLEM LIST:  Principal Problem:    NSTEMI, initial episode of care [facility])  Active Problems:    Acute systolic (congestive) heart failure (HCC)    Chest pain due to myocardial ischemia    Coronary artery disease involving native coronary artery with unstable angina pectoris ([address]    Hypertension    H/O heart artery stent    Osteoarthritis cervical spine    Osteoarthritis of lumbar spine  Resolved Problems:    * No resolved hospital problems. *      SUBJECTIVE:  Radha Pichardo states he experienced some discomfort last evening but none today. He failed to mention that he underwent cardiac catheterization and intervention in 2013 with stents to the \"backside of his heart\". REVIEW OF SYSTEMS:  General ROS: positive for - fatigue  Psychological ROS: negative for - anxiety , depression  Ophthalmic ROS: negative for - decreased vision or visual distortion. ENT ROS: negative  Allergy and Immunology ROS: negative  Hematological and Lymphatic ROS: negative  Endocrine: no heat or cold intolerance and no polyphagia, polydipsia, or polyuria  Respiratory ROS: positive for - shortness of breath  Cardiovascular ROS: positive for - chest pain and dyspnea on exertion. Gastrointestinal ROS: no abdominal pain, change in bowel habits, or black or bloody stools  Genito-Urinary ROS: no nocturia, dysuria, trouble voiding, frequency or hematuria  Musculoskeletal ROS: negative for- myalgias, arthralgias, or claudication  Neurological ROS: no TIA or stroke symptoms otherwise no significant change in symptoms or problems since yesterday as documented in previous progress notes.     SCHEDULED MEDICATIONS:   aspirin  81 mg Oral Daily    rosuvastatin  10 mg Oral Nightly    pantoprazole  40 mg Oral BID AC    Arformoterol Tartrate  15 mcg Nebulization BID    budesonide  250 mcg Nebulization BID    enoxaparin  40 mg Subcutaneous Daily    ipratropium-albuterol  1 ampule Inhalation Q4H WA    nitroglycerin  1 inch Topical Q8H    metoprolol tartrate  12.5 mg Oral BID       VITAL SIGNS:                                                                                                                          /63   Pulse 67   Temp 98.1 °F (36.7 °C) (Oral)   Resp 20   Ht 5' 9\" (1.753 m)   Wt 249 lb 5.4 oz (113.1 kg)   SpO2 95%   BMI 36.82 kg/m²   Patient Vitals for the past 96 hrs (Last 3 readings):   Weight   04/26/21 0628 249 lb 5.4 oz (113.1 kg)   04/25/21 0505 249 lb 9 oz (113.2 kg)   04/24/21 1647 247 lb 8 oz (112.3 kg)     OBJECTIVE:    HEENT: PERRL, EOM  Intact; sclera non-icteric, conjunctiva pink. Carotids are brisk in upstroke with normal contour. No carotid bruits. Normal jugular venous pulsation at 45°. No palpable cervical nor supraclavicular nodes. Thyroid not palpable. Trachea midline. Chest: Even excursion  Lungs: CTA B, no expiratory wheezes or rhonchi, no decreased tactile fremitus without inspiratory rales. Heart: Regular  rhythm; S1 > S2, no gallop or murmur. No clicks, rub, palpable thrills   or heaves. PMI nondisplaced, 5th intercostal space MCL. Abdomen: Soft, nontender, nondistended,  moderately protuberant, no masses or organomegaly. Bowel sounds active. Extremities: Without clubbing, cyanosis or edema. Pulses present 3+ upper extermities bilaterally; present 2+ DP and present 2+ PT bilaterally.      Data:   Scheduled Meds: Reviewed  Continuous Infusions:     Intake/Output Summary (Last 24 hours) at 4/26/2021 1200  Last data filed at 4/26/2021 0801  Gross per 24 hour   Intake 660 ml   Output 2600 ml   Net -1940 ml     CBC:   Recent Labs     04/24/21  0818 04/25/21  0357 04/26/21  0339   WBC 9.7 21.1* 8.2   HGB 14.7 14.5 13.0   HCT 41.5 41.5 38.6    308 282     BMP:  Recent Labs     04/24/21  0818 04/25/21  0357 04/26/21  0339    136 136   K 4.2 3.9 4.3    100 103   CO2 26 21* 24   BUN 10 13 11   CREATININE 0.9 0.9 0.9   LABGLOM >60 >60 >60     ABGs: No results found for: Mildly dilated right ventricle. TAPSE is normal   Left Atrium  The left atrium is mild-moderately dilated. Interatrial septum appears intact. The LAESV Index is >34 ml/m2. Right Atrium  Right atrium is normal in size. Mitral Valve  Structurally normal mitral valve. Mild mitral regurgitation is present. Tricuspid Valve  The tricuspid valve appears structurally normal.  Mild to moderate tricuspid regurgitation. RVSP is 51 mmHg. Pulmonary hypertension is mild to moderate . Aortic Valve  The aortic valve is trileaflet. Aortic valve opens well. The aortic valve appears mildly sclerotic. No doppler evidence of aortic stenosis or regurgitation. Pulmonic Valve  Pulmonic valve is structurally normal.  No evidence of pulmonic regurgitation. Pericardial Effusion  No evidence of pericardial thickening/calcification present. There is a small pericardial effusion. Aorta  Aortic root dimension within normal limits. Miscellaneous  Dilated Inferior Vena Cava. Inferior Vena Cava, normal respiratory variation. Conclusions   Summary  Left ventricle mildly dilated. Inferolateral hypokinesis moderate . Lateral wall hypokinesis, mild. Estimated left ventricular ejection fraction is 50±5%. Cannot accurately characterize diastolic function secondary to mitral  valve disease. The left atrium is mild-moderately dilated. Mildly dilated right ventricle. TAPSE is normal  Structurally normal mitral valve. Mild mitral regurgitation is present. Mild to moderate tricuspid regurgitation. RVSP is 51 mmHg. Pulmonary hypertension is mild to moderate . There is a small pericardial effusion. Technically fair quality study. Technically difficult study due to patient''s body habitus. No comparison study available. Suggest clinical correlation.    Signature   ----------------------------------------------------------------  Electronically signed by Sandy Perry DO(Interpreting  physician) on 04/25/2021 06:54 PM ----------------------------------------------------------------  M-Mode/2D Measurements & Calculations   LV Diastolic    LV Systolic Dimension: 4.8   AV Cusp Separation: 1.7 cmLA  Dimension: 6.4  cm                           Dimension: 5.2 cmAO Root  cm              LV Volume Diastolic: 393 ml  Dimension: 3.3 cm  LV FS:25 %      LV Volume Systolic: 134.1 ml  LV PW           LV EDV/LV EDV Index: 221  Diastolic: 1.1  ID/19 FA/U^7KJ ESV/LV ESV  cm              Index: 106.2 ml/47ml/ m^2    RV Diastolic Dimension: 3.5  LV PW Systolic: EF Calculated: 57.7 %        cm  1.3 cm          LV Mass Index: 122 l/min*m^2  Septum          LV Length: 9.7 cm            LA/Aorta: 4.00  Diastolic: 0.9                               Ascending Aorta: 3 cm  cm              LVOT: 2.1 cm                 LA volume/Index: 120 ml  Septum                                       /00.86KO/H^3  Systolic: 1.3                                RA Area: 18.4 cm^2  cm  CO: 8.92 l/min                               IVC Expiration: 2.1 cm  CI: 3.95  l/m*m^2  LV Mass: 275.63  g  Doppler Measurements & Calculations   MV Peak E-Wave: 1.11 m/s   AV Peak Velocity:    LVOT Peak Velocity: 1.45  MV Peak A-Wave: 0.71 m/s   2.07 m/s             m/s  MV E/A Ratio: 1.56         AV Peak Gradient:    LVOT Mean Velocity: 0.85  MV Peak Gradient: 5.6 mmHg 17.19 mmHg           m/s  MV Mean Gradient: 2.2 mmHg AV Mean Velocity:    LVOT Peak Gradient: 8.4  MV Mean Velocity: 0.69 m/s 1.39 m/s             mmHgLVOT Mean Gradient:  MV Deceleration Time:      AV Mean Gradient:    3.7 mmHg  152.6 msec                 8.8 mmHg             Estimated RVSP: 50.7 mmHg  MV P1/2t: 64.1 msec        AV VTI: 34.7 cm      Estimated RAP:8 mmHg  MVA by PHT:3.43 cm^2       AV Area  MV Area (continuity): 3.1  (Continuity):2.42  cm^2                       cm^2                 TR Velocity:3.27 m/s  MV E' Septal Velocity: 0.1                      TR Gradient:42.69 mmHg  m/s                        LVOT inflammation. Pelvis: Urinary bladder is unremarkable in CT appearance. Peritoneum/Retroperitoneum: No free fluid or free air. Aorta is normal in caliber. Bones/Soft Tissues: No acute abnormality. There are degenerative disc changes at L4-5. Acute inflammatory changes of the duodenum, compatible with duodenitis. Bilateral lung base ground-glass airspace opacities and septal thickening can be seen with edema. Findings favored less likely to be related to pneumonia. Recommend radiographic follow-up. Us Gallbladder Ruq    Result Date: 4/24/2021  EXAMINATION: RIGHT UPPER QUADRANT ULTRASOUND 4/24/2021 9:11 am COMPARISON: None. HISTORY: ORDERING SYSTEM PROVIDED HISTORY: PAIN TECHNOLOGIST PROVIDED HISTORY: Reason for exam:->PAIN What reading provider will be dictating this exam?->CRC FINDINGS: LIVER:  There is increased echogenicity of the liver, which can be seen with fatty infiltration. Liver measures up to 19.8 cm craniocaudal. BILIARY SYSTEM:  There is no evidence of cholelithiasis or gallbladder wall thickening. Common bile duct is within normal limits measuring 5-6 mm. RIGHT KIDNEY: The right kidney is grossly unremarkable without evidence of hydronephrosis. PANCREAS: Not well visualized OTHER: No evidence of right upper quadrant ascites. There is no sonographic evidence of cholelithiasis or cholecystitis. No biliary ductal dilatation. Fatty infiltration of the liver     Xr Chest Portable    Result Date: 4/24/2021  EXAMINATION: ONE XRAY VIEW OF THE CHEST 4/24/2021 8:40 am COMPARISON: None. HISTORY: ORDERING SYSTEM PROVIDED HISTORY: SOB TECHNOLOGIST PROVIDED HISTORY: Reason for exam:->SOB FINDINGS: Lung volumes are shallow and there is elevation of the left hemidiaphragm. There are faint linear/reticular airspace opacities at both lung bases. No evidence of a sizable pleural effusion. No pneumothorax.   Heart size is unable to be accurately assessed on this single portable view of the chest. No acute osseous abnormality. Shallow lung volumes with reticular airspace opacities at both lung bases. Findings are nonspecific and may be on the basis of atelectasis multifocal pneumonia. Cta Pulmonary W Contrast    Result Date: 4/24/2021  EXAMINATION: CTA OF THE CHEST 4/24/2021 11:54 am TECHNIQUE: CTA of the chest was performed after the administration of intravenous contrast.  Multiplanar reformatted images are provided for review. MIP images are provided for review. Dose modulation, iterative reconstruction, and/or weight based adjustment of the mA/kV was utilized to reduce the radiation dose to as low as reasonably achievable. COMPARISON: None. HISTORY: ORDERING SYSTEM PROVIDED HISTORY: possible PE TECHNOLOGIST PROVIDED HISTORY: Reason for exam:->possible PE Decision Support Exception->Emergency Medical Condition (MA) FINDINGS: Pulmonary Arteries: Pulmonary arteries are adequately opacified for evaluation. No evidence of intraluminal filling defect to suggest pulmonary embolism. Main pulmonary artery is normal in caliber. Mediastinum: No evidence of mediastinal lymphadenopathy. The heart and pericardium demonstrate no acute abnormality. There is no acute abnormality of the thoracic aorta. Lungs/pleura: There are mild hazy opacity in both lungs with mild infiltrates at the posterior lung bases. No evidence of pleural effusion or pneumothorax. Upper Abdomen: Limited images of the upper abdomen are unremarkable. Soft Tissues/Bones: No acute bone or soft tissue abnormality. No evidence of pulmonary embolism. Mild cardiomegaly and suspect interstitial edema.        EKG: See Report  Echo: See Report      IMPRESSIONS:  Principal Problem:    NSTEMI, initial episode of care [facility])  Active Problems:    Acute systolic (congestive) heart failure (HCC)    Chest pain due to myocardial ischemia    Coronary artery disease involving native coronary artery with unstable angina pectoris ([address]    Hypertension    H/O heart artery stent    Osteoarthritis cervical spine    Osteoarthritis of lumbar spine  Resolved Problems:    * No resolved hospital problems. *      RECOMMENDATIONS:  At this time I I have reviewed the results of Mr. Kale Harvey two-dimensional echocardiogram with him indicating that he does indeed have left ventricular wall motion normalities and thus he should certainly undergo cardiac catheterization considering his abnormal troponin, proBNP and now echocardiographic findings. I have reviewed the procedure indications and potential risks with him in detail and he indicates that he fully understands and wishes to proceed. I have spent more than 27 minutes face to face with Linda Potter and reviewing notes and laboratory data, with greater than 50% of this time instructing and counseling the patient face to face regarding my findings and recommendations and I have answered all questions as posed to me by  Julian Yola. Melia Beltrán, DO FACP,FACC,[facility]      NOTE:  This report was transcribed using voice recognition software.   Every effort was made to ensure accuracy; however, inadvertent computerized transcription errors may be present

## 2021-04-26 NOTE — H&P
[address] [facility]  History and Physical      CHIEF COMPLAINT: Chest pain    Patient of Angelica Quintero MD presents with:  CAD in native artery    History of Present Illness:   Patient is a 40-year-old male with a past medical history of CHF, CAD, wound and osteoarthritis. Patient presented to [facility] ED for chest pain associated with shortness of breath. Patient states 2 weeks ago he had shortness of breath associated with chest pain went to the ED and signed out AMA. Patient described the chest pain as burning sensation. He states he woke up at night diaphoretic and he went back to sleep in the morning he woke up and he had a burning sensation with shortness of breath. Patient states he can no longer tolerate the chest pain. He said there were no aggravating factors and there are no relieving factors. Patient does have a history of cardiac stents in 2013. Patient has a very strong cardiac family history. Patient was transferred to [address] [place] [address] for heart catheterization with Dr. Derek Lozano. REVIEW OF SYSTEMS:  Pertinent negatives are above in HPI. 10 point ROS otherwise negative. Past Medical History:   Diagnosis Date    Chest pain due to myocardial ischemia 04/25/2021    [facility])     Coronary artery disease involving native coronary artery with unstable angina pectoris [facility]     H/O heart artery stent 2013    X2;  [facility]    Hypertension     NSTEMI, initial episode of care [facility]) 04/25/2021    Osteoarthritis cervical spine     Osteoarthritis of lumbar spine          Past Surgical History:   Procedure Laterality Date    [facility]ATION  2013    [facility]    COLONOSCOPY  2015    Findings unknown    CORONARY ANGIOPLASTY WITH STENT PLACEMENT  04/26/2021    Dr. Reed Heading- KATYA XIENCE BALBINA 3.25 x 15 RCA, XIENCE BALBINA 3.0 x 18, 3.0 x 12 CX    HEMORRHOID SURGERY      X2    PTCA 2013    X2, stents, [facility]       Medications Prior to Admission:    Medications Prior to Admission: nitroglycerin (NITRO-BID) 2 % ointment, Place 1 inch onto the skin every 8 hours  rosuvastatin (CRESTOR) 10 MG tablet, Take 1 tablet by mouth nightly  metoprolol tartrate (LOPRESSOR) 25 MG tablet, Take 0.5 tablets by mouth 2 times daily  nicotine (NICODERM CQ) 21 MG/24HR, Place 1 patch onto the skin daily as needed (Nicotine withdrawal)  pantoprazole (PROTONIX) 40 MG tablet, Take 1 tablet by mouth 2 times daily (before meals)  aspirin 81 MG chewable tablet, Take 81 mg by mouth daily    Note that the patient's home medications were reviewed and the above list is accurate to the best of my knowledge at the time of the exam.    Allergies:    Penicillins    Social History:    reports that he has been smoking cigarettes. He has a 33.00 pack-year smoking history. He has never used smokeless tobacco. He reports previous alcohol use. He reports that he does not use drugs. Family History:   family history includes Heart Attack in his paternal uncle; Liver Disease in his mother; Other in his father. PHYSICAL EXAM:    Vitals:  /79   Pulse 84   Temp 97.9 °F (36.6 °C) (Temporal)   Resp 20   Ht 5' 9\" (1.753 m)   Wt 250 lb (113.4 kg)   SpO2 95%   BMI 36.92 kg/m²       General appearance: NAD, conversant, pleasant  Eyes: Sclerae anicteric, PERRLA  HEENT: AT/NC, MMM  Neck: FROM, supple, no thyromegaly  Lymph: No cervical / supraclavicular lymphadenopathy  Lungs: Clear to auscultation, WOB normal  CV: RRR, no MRGs, bilateral lower extremity edema  Abdomen: Soft, non-tender; no masses or HSM, +BS  Extremities: FROM without synovitis. No clubbing or cyanosis of the hands. Bilateral lower extremity edema 1+  Skin: no rash, induration, lesions, or ulcers  Psych: Calm and cooperative. Normal judgement and insight. Normal mood and affect.   Neuro: Alert and interactive, face symmetric, with attending and agreed upon plan of care. Code status: Full  Requires inpatient level of care  Kahlil Moy APRN-CNP  7:01 PM  4/26/2021     Pt transferred from [facility] for [facility]   S/p above on 4/26- tolerated well  Continue asa/Effient , statin and bb  Monitor 24 hr     I personally saw, examined and provided care for the patient. Radiographs, labs and medication list were reviewed by me independently. The case was discussed in detail and plans for care were established. Review of [address] [place] [address], documentation was conducted and revisions were made as appropriate directly by me. I agree with the above documented exam, problem list, and plan of care.      Funmi Ruano MD  4/27/2021

## 2021-04-27 VITALS
HEART RATE: 65 BPM | RESPIRATION RATE: 16 BRPM | HEIGHT: 69 IN | OXYGEN SATURATION: 98 % | SYSTOLIC BLOOD PRESSURE: 106 MMHG | WEIGHT: 237.4 LBS | BODY MASS INDEX: 35.16 KG/M2 | DIASTOLIC BLOOD PRESSURE: 64 MMHG | TEMPERATURE: 97.1 F

## 2021-04-27 PROBLEM — E78.00 PURE HYPERCHOLESTEROLEMIA WITH TARGET LOW DENSITY LIPOPROTEIN (LDL) CHOLESTEROL LESS THAN 70 MG/DL: Status: ACTIVE | Noted: 2021-04-26

## 2021-04-27 LAB
ANION GAP SERPL CALCULATED.3IONS-SCNC: 11 MMOL/L (ref 7–16)
BUN BLDV-MCNC: 10 MG/DL (ref 6–20)
CALCIUM SERPL-MCNC: 9.2 MG/DL (ref 8.6–10.2)
CHLORIDE BLD-SCNC: 104 MMOL/L (ref 98–107)
CO2: 21 MMOL/L (ref 22–29)
CREAT SERPL-MCNC: 0.9 MG/DL (ref 0.7–1.2)
EKG ATRIAL RATE: 75 BPM
EKG P AXIS: 26 DEGREES
EKG P-R INTERVAL: 174 MS
EKG Q-T INTERVAL: 398 MS
EKG QRS DURATION: 92 MS
EKG QTC CALCULATION (BAZETT): 444 MS
EKG R AXIS: -19 DEGREES
EKG T AXIS: 3 DEGREES
EKG VENTRICULAR RATE: 75 BPM
GFR AFRICAN AMERICAN: >60
GFR NON-AFRICAN AMERICAN: >60 ML/MIN/1.73
GLUCOSE BLD-MCNC: 91 MG/DL (ref 74–99)
POTASSIUM REFLEX MAGNESIUM: 4.5 MMOL/L (ref 3.5–5)
SODIUM BLD-SCNC: 136 MMOL/L (ref 132–146)
URINE CULTURE, ROUTINE: NORMAL

## 2021-04-27 PROCEDURE — 80048 BASIC METABOLIC PNL TOTAL CA: CPT

## 2021-04-27 PROCEDURE — 96372 THER/PROPH/DIAG INJ SC/IM: CPT

## 2021-04-27 PROCEDURE — 6370000000 HC RX 637 (ALT 250 FOR IP): Performed by: INTERNAL MEDICINE

## 2021-04-27 PROCEDURE — 6360000002 HC RX W HCPCS: Performed by: FAMILY MEDICINE

## 2021-04-27 PROCEDURE — 36415 COLL VENOUS BLD VENIPUNCTURE: CPT

## 2021-04-27 PROCEDURE — G0378 HOSPITAL OBSERVATION PER HR: HCPCS

## 2021-04-27 PROCEDURE — 2580000003 HC RX 258: Performed by: INTERNAL MEDICINE

## 2021-04-27 PROCEDURE — 6370000000 HC RX 637 (ALT 250 FOR IP): Performed by: FAMILY MEDICINE

## 2021-04-27 RX ORDER — ONDANSETRON 2 MG/ML
4 INJECTION INTRAMUSCULAR; INTRAVENOUS EVERY 6 HOURS PRN
Status: DISCONTINUED | OUTPATIENT
Start: 2021-04-27 | End: 2021-04-27 | Stop reason: HOSPADM

## 2021-04-27 RX ORDER — SODIUM CHLORIDE 0.9 % (FLUSH) 0.9 %
5-40 SYRINGE (ML) INJECTION PRN
Status: DISCONTINUED | OUTPATIENT
Start: 2021-04-27 | End: 2021-04-27 | Stop reason: HOSPADM

## 2021-04-27 RX ORDER — ROSUVASTATIN CALCIUM 20 MG/1
20 TABLET, COATED ORAL DAILY
Status: DISCONTINUED | OUTPATIENT
Start: 2021-04-27 | End: 2021-04-27 | Stop reason: HOSPADM

## 2021-04-27 RX ORDER — ROSUVASTATIN CALCIUM 20 MG/1
20 TABLET, COATED ORAL DAILY
Qty: 30 TABLET | Refills: 3 | Status: SHIPPED | OUTPATIENT
Start: 2021-04-28

## 2021-04-27 RX ORDER — ASPIRIN 81 MG/1
81 TABLET ORAL DAILY
Qty: 90 TABLET | Refills: 1 | Status: SHIPPED | OUTPATIENT
Start: 2021-04-27

## 2021-04-27 RX ORDER — ACETAMINOPHEN 325 MG/1
650 TABLET ORAL EVERY 4 HOURS PRN
Status: DISCONTINUED | OUTPATIENT
Start: 2021-04-27 | End: 2021-04-27 | Stop reason: SDUPTHER

## 2021-04-27 RX ORDER — SODIUM CHLORIDE 0.9 % (FLUSH) 0.9 %
5-40 SYRINGE (ML) INJECTION EVERY 12 HOURS SCHEDULED
Status: DISCONTINUED | OUTPATIENT
Start: 2021-04-27 | End: 2021-04-27 | Stop reason: HOSPADM

## 2021-04-27 RX ORDER — PRASUGREL 10 MG/1
10 TABLET, FILM COATED ORAL DAILY
Qty: 30 TABLET | Refills: 0 | Status: SHIPPED | OUTPATIENT
Start: 2021-04-28

## 2021-04-27 RX ORDER — PRASUGREL 10 MG/1
10 TABLET, FILM COATED ORAL DAILY
Status: DISCONTINUED | OUTPATIENT
Start: 2021-04-28 | End: 2021-04-27 | Stop reason: HOSPADM

## 2021-04-27 RX ORDER — SODIUM CHLORIDE 9 MG/ML
25 INJECTION, SOLUTION INTRAVENOUS PRN
Status: DISCONTINUED | OUTPATIENT
Start: 2021-04-27 | End: 2021-04-27 | Stop reason: HOSPADM

## 2021-04-27 RX ADMIN — PANTOPRAZOLE SODIUM 40 MG: 40 TABLET, DELAYED RELEASE ORAL at 06:21

## 2021-04-27 RX ADMIN — ASPIRIN 81 MG CHEWABLE TABLET 81 MG: 81 TABLET CHEWABLE at 08:28

## 2021-04-27 RX ADMIN — ENOXAPARIN SODIUM 40 MG: 40 INJECTION, SOLUTION INTRAVENOUS; SUBCUTANEOUS at 08:28

## 2021-04-27 RX ADMIN — NITROGLYCERIN 1 INCH: 20 OINTMENT TOPICAL at 11:38

## 2021-04-27 RX ADMIN — ROSUVASTATIN 20 MG: 20 TABLET, FILM COATED ORAL at 08:28

## 2021-04-27 RX ADMIN — METOPROLOL TARTRATE 12.5 MG: 25 TABLET, FILM COATED ORAL at 08:28

## 2021-04-27 RX ADMIN — SODIUM CHLORIDE, PRESERVATIVE FREE 10 ML: 5 INJECTION INTRAVENOUS at 08:29

## 2021-04-27 RX ADMIN — PANTOPRAZOLE SODIUM 40 MG: 40 TABLET, DELAYED RELEASE ORAL at 15:46

## 2021-04-27 ASSESSMENT — PAIN SCALES - GENERAL: PAINLEVEL_OUTOF10: 0

## 2021-04-27 NOTE — PLAN OF CARE
Problem: Cardiac:  Goal: Ability to maintain an adequate cardiac output will improve  Description: Ability to maintain an adequate cardiac output will improve  Outcome: Met This Shift     Problem: Fluid Volume:  Goal: Ability to achieve and maintain adequate urine output will improve  Description: Ability to achieve and maintain adequate urine output will improve  Outcome: Met This Shift     Problem: Respiratory:  Goal: Respiratory status will improve  Description: Respiratory status will improve  Outcome: Met This Shift

## 2021-04-27 NOTE — PLAN OF CARE
Problem: Cardiac:  Goal: Ability to maintain an adequate cardiac output will improve  Description: Ability to maintain an adequate cardiac output will improve  4/27/2021 1543 by Darryl Caro RN  Outcome: Completed  4/27/2021 0924 by Darryl Caro RN  Outcome: Met This Shift    Goal: Hemodynamic stability will improve  Description: Hemodynamic stability will improve  4/27/2021 1543 by Darryl Caro RN  Outcome: Completed    Problem: Fluid Volume:  Goal: Ability to achieve and maintain adequate urine output will improve  Description: Ability to achieve and maintain adequate urine output will improve  4/27/2021 1543 by Darryl Caro RN  Outcome: Completed    Problem: Respiratory:  Goal: Respiratory status will improve  Description: Respiratory status will improve  4/27/2021 1543 by Darryl Caro RN  Outcome: Completed  4/27/2021 0924 by Darryl Caro RN  Outcome: Met This Shift

## 2021-04-27 NOTE — PROGRESS NOTES
Physical Therapy    Date: 2021       Patient Name: Vicki Schuler  : 1975      MRN: [id number]    PT attempted. Patient declined due to being Ind already in room and hallway per his report. \"I have been moving all night\". Please consult PT if needs change. No DME needed.      Trupti Livingston, PT

## 2021-04-27 NOTE — PATIENT CARE CONFERENCE
[facility] Quality Flow/Interdisciplinary Rounds Progress Note        Quality Flow Rounds held on April 27, 2021    Disciplines Attending:  Bedside Nurse, ,  and Nursing Unit [address] was admitted on 4/26/2021  6:07 PM    Anticipated Discharge Date:  Expected Discharge Date: 04/27/21    Disposition:    Ankur Score:  Ankur Scale Score: 23    Readmission Risk              Risk of Unplanned Readmission:        0           Discussed patient goal for the day, patient clinical progression, and barriers to discharge.   The following Goal(s) of the Day/Commitment(s) have been identified:  Discharge - Obtain Order      Libby Telles  April 27, 2021

## 2021-04-27 NOTE — PROGRESS NOTES
Subjective: The patient is awake and alert. No acute events overnight. Denies chest pain, angina, SOB   Anxious for home     Objective:    /71   Pulse 71   Temp 96.8 °F (36 °C) (Temporal)   Resp 16   Ht 5' 9\" (1.753 m)   Wt 237 lb 6.4 oz (107.7 kg)   SpO2 95%   BMI 35.06 kg/m²     In: 1400 [P.O.:1400]  Out: 2300   In: 1400   Out: 2300 [Urine:2300]    General appearance: NAD, conversant  HEENT: AT/NC, MMM  Neck: FROM, supple  Lungs: Clear to auscultation  CV: RRR, no MRGs  Vasc: Radial pulses 2+  Abdomen: Soft, non-tender; no masses or HSM  Extremities: No peripheral edema or digital cyanosis  Skin: no rash, lesions or ulcers  Psych: Alert and oriented to person, place and time  Neuro: Alert and interactive     Recent Labs     04/25/21  0357 04/26/21  0339   WBC 21.1* 8.2   HGB 14.5 13.0   HCT 41.5 38.6    282       Recent Labs     04/25/21  0357 04/26/21  0339 04/27/21  0606    136 136   K 3.9 4.3 4.5    103 104   CO2 21* 24 21*   BUN 13 11 10   CREATININE 0.9 0.9 0.9   CALCIUM 9.6 9.0 9.2       Assessment:    Principal Problem:    CAD in native artery  Active Problems:    NSTEMI, initial episode of care [facility])  Resolved Problems:    * No resolved hospital problems. *      Plan:       40-year-old male with past medical history of HTN, CHF, and osteoarthritis admitted with myocardial ischemia NSTEMI.     1. s/p left haeart cath bby dr. Guillermo Murillo / ? pci  2.  continue effient . Asa , bb and statin   3. Diet modifications  4. Smoking cessation  5. Medication for other comorbidities continue as appropriate dose adjustment as necessary.     [id number] [address] for [employer] today if ok with cards     Mireya Valadez MD  12:13 PM  4/27/2021

## 2021-04-27 NOTE — PLAN OF CARE
Problem: Cardiac:  Goal: Ability to maintain an adequate cardiac output will improve  Description: Ability to maintain an adequate cardiac output will improve  4/27/2021 0924 by Dali Beyer RN  Outcome: Met This Shift     Problem: Respiratory:  Goal: Respiratory status will improve  Description: Respiratory status will improve  4/27/2021 0924 by Dali Beyer RN  Outcome: Met This Shift

## 2021-04-27 NOTE — CARE COORDINATION
SOCIAL WORK/CASEMANAGEMENT TRANSITION OF CARE [id number] [address], [address], [place], [phone]): I met with pt who was ambulating around the room independently. We cancelled PT and OT eval with pt agreement. Pt is observation status. Pt lives alone in a 2 story home that he is fixing up. 2nd floor bed and bath. No hhc pta. Has a pulse ox and bp cuff at home. Pt has 2 grown children. No needs anticipated and pt is eager to go home today. Sw/cm to follow.  [place]  4/27/2021

## 2021-04-27 NOTE — CONSULTS
Met with patient and discussed that their physician has ordered a referral to our outpatient Phase II Cardiac Rehabilitation program. Reviewed the benefits of cardiac rehabilitation based on their diagnosis and personal risk factors. Patient demonstrates strong interest in Cardiac Rehabilitation at this time. Cardiac Rehabilitation brochure provided to patient/family. The Cardiac Rehabilitation Program has been provided the patient's referral information and pertinent patient details and history. The patient may call [facility] at [phone] for additional information or questions. Contact information for [facility] [employer] and other choices close to the patient's residence have been provided in the discharge instructions so that the patient may call and schedule an appointment when cleared by their physician.  Thank you for the referral.

## 2021-04-27 NOTE — PROGRESS NOTES
Lab Results   Component Value Date    TRIG 134 04/25/2021    HDL 28 04/25/2021    LDLCALC 120 04/25/2021    CHOL 175 04/25/2021      Hemoglobin A1C: No components found for: HGBA1C     RAD:   Echo Complete    Result Date: 4/25/2021  Transthoracic Echocardiography Report (TTE)  Demographics   Patient Name        Miriam Gaviria   Gender                Male   Medical Record      [id number]    Room Number           9453  Number   Account #           [de-identified]   Procedure Date        04/25/2021   Corporate ID                    Ordering Physician    Houston Blount   Accession Number    [id number]  Referring Physician   Conner Remy   Date of Birth       1975  Sonographer           Jaleel Gautam   Age                 50 year(s)  Interpreting          Stevie Zaldivar DO                                  Physician                                   Any Other  Procedure Type of Study   TTE procedure:Echo Complete W/Doppler & Color Flow. Procedure Date Date: 04/25/2021 Start: 07:40 AM Study Location: Portable Technical Quality: Poor visualization due to body habitus. Indications:Congestive heart failure. Patient Status: Routine Contrast Medium: Definity. Height: 69 inches Weight: 247 pounds BSA: 2.26 m^2 BMI: 36.48 kg/m^2 HR: 106 bpm BP: 112/68 mmHg  Findings   Left Ventricle  Left ventricle mildly dilated. Inferolateral hypokinesis moderate . Lateral wall hypokinesis, mild. Estimated left ventricular ejection fraction is 50±5%. Cannot accurately characterize diastolic function secondary to mitral  valve disease. Right Ventricle  Mildly dilated right ventricle. TAPSE is normal   Left Atrium  The left atrium is mild-moderately dilated. Interatrial septum appears intact. The LAESV Index is >34 ml/m2. Right Atrium  Right atrium is normal in size. Mitral Valve  Structurally normal mitral valve. Mild mitral regurgitation is present.    Tricuspid Valve  The tricuspid valve appears structurally normal.  Mild to moderate tricuspid regurgitation. RVSP is 51 mmHg. Pulmonary hypertension is mild to moderate . Aortic Valve  The aortic valve is trileaflet. Aortic valve opens well. The aortic valve appears mildly sclerotic. No doppler evidence of aortic stenosis or regurgitation. Pulmonic Valve  Pulmonic valve is structurally normal.  No evidence of pulmonic regurgitation. Pericardial Effusion  No evidence of pericardial thickening/calcification present. There is a small pericardial effusion. Aorta  Aortic root dimension within normal limits. Miscellaneous  Dilated Inferior Vena Cava. Inferior Vena Cava, normal respiratory variation. Conclusions   Summary  Left ventricle mildly dilated. Inferolateral hypokinesis moderate . Lateral wall hypokinesis, mild. Estimated left ventricular ejection fraction is 50±5%. Cannot accurately characterize diastolic function secondary to mitral  valve disease. The left atrium is mild-moderately dilated. Mildly dilated right ventricle. TAPSE is normal  Structurally normal mitral valve. Mild mitral regurgitation is present. Mild to moderate tricuspid regurgitation. RVSP is 51 mmHg. Pulmonary hypertension is mild to moderate . There is a small pericardial effusion. Technically fair quality study. Technically difficult study due to patient''s body habitus. No comparison study available. Suggest clinical correlation.    Signature   ----------------------------------------------------------------  Electronically signed by Bree Limon DO(Interpreting  physician) on 04/25/2021 06:54 PM  ----------------------------------------------------------------  M-Mode/2D Measurements & Calculations   LV Diastolic    LV Systolic Dimension: 4.8   AV Cusp Separation: 1.7 cmLA  Dimension: 6.4  cm                           Dimension: 5.2 cmAO Root  cm              LV Volume Diastolic: 850 ml  Dimension: 3.3 cm  LV FS:25 %      LV Volume Systolic: 208.1 ml  LV PW           LV EDV/LV EDV Index: 306  Diastolic: 1.1  OQ/26 NT/U^2HY ESV/LV ESV  cm              Index: 106.2 ml/47ml/ m^2    RV Diastolic Dimension: 3.5  LV PW Systolic: EF Calculated: 39.8 %        cm  1.3 cm          LV Mass Index: 122 l/min*m^2  Septum          LV Length: 9.7 cm            LA/Aorta: 5.21  Diastolic: 0.9                               Ascending Aorta: 3 cm  cm              LVOT: 2.1 cm                 LA volume/Index: 120 ml  Septum                                       /53.52NV/O^6  Systolic: 1.3                                RA Area: 18.4 cm^2  cm  CO: 8.92 l/min                               IVC Expiration: 2.1 cm  CI: 3.95  l/m*m^2  LV Mass: 275.63  g  Doppler Measurements & Calculations   MV Peak E-Wave: 1.11 m/s   AV Peak Velocity:    LVOT Peak Velocity: 1.45  MV Peak A-Wave: 0.71 m/s   2.07 m/s             m/s  MV E/A Ratio: 1.56         AV Peak Gradient:    LVOT Mean Velocity: 0.85  MV Peak Gradient: 5.6 mmHg 17.19 mmHg           m/s  MV Mean Gradient: 2.2 mmHg AV Mean Velocity:    LVOT Peak Gradient: 8.4  MV Mean Velocity: 0.69 m/s 1.39 m/s             mmHgLVOT Mean Gradient:  MV Deceleration Time:      AV Mean Gradient:    3.7 mmHg  152.6 msec                 8.8 mmHg             Estimated RVSP: 50.7 mmHg  MV P1/2t: 64.1 msec        AV VTI: 34.7 cm      Estimated RAP:8 mmHg  MVA by PHT:3.43 cm^2       AV Area  MV Area (continuity): 3.1  (Continuity):2.42  cm^2                       cm^2                 TR Velocity:3.27 m/s  MV E' Septal Velocity: 0.1                      TR Gradient:42.69 mmHg  m/s                        LVOT VTI: 24.3 cm    PV Peak Velocity: 1.28 m/s  MV E' Lateral Velocity: 11 IVRT: 50.7 msec      PV Peak Gradient: 6.56  m/s                        Estimated PASP:      mmHg  MR Velocity: 4.89 m/s      50.69 mmHg           PV Mean Velocity: 0.85 m/s  MV ZENIA PISA: 0.13 cm^2                          PV Mean Gradient: 3.3 mmHg  MR VTI: 128.5 cm  Alias Velocity: 0.21  m/sPISA Radius: 0.7 cm   PISA can be seen with edema. Findings favored less likely to be related to pneumonia. Recommend radiographic follow-up. Us Gallbladder Ruq    Result Date: 4/24/2021  EXAMINATION: RIGHT UPPER QUADRANT ULTRASOUND 4/24/2021 9:11 am COMPARISON: None. HISTORY: ORDERING SYSTEM PROVIDED HISTORY: PAIN TECHNOLOGIST PROVIDED HISTORY: Reason for exam:->PAIN What reading provider will be dictating this exam?->CRC FINDINGS: LIVER:  There is increased echogenicity of the liver, which can be seen with fatty infiltration. Liver measures up to 19.8 cm craniocaudal. BILIARY SYSTEM:  There is no evidence of cholelithiasis or gallbladder wall thickening. Common bile duct is within normal limits measuring 5-6 mm. RIGHT KIDNEY: The right kidney is grossly unremarkable without evidence of hydronephrosis. PANCREAS: Not well visualized OTHER: No evidence of right upper quadrant ascites. There is no sonographic evidence of cholelithiasis or cholecystitis. No biliary ductal dilatation. Fatty infiltration of the liver     Xr Chest Portable    Result Date: 4/24/2021  EXAMINATION: ONE XRAY VIEW OF THE CHEST 4/24/2021 8:40 am COMPARISON: None. HISTORY: ORDERING SYSTEM PROVIDED HISTORY: SOB TECHNOLOGIST PROVIDED HISTORY: Reason for exam:->SOB FINDINGS: Lung volumes are shallow and there is elevation of the left hemidiaphragm. There are faint linear/reticular airspace opacities at both lung bases. No evidence of a sizable pleural effusion. No pneumothorax. Heart size is unable to be accurately assessed on this single portable view of the chest. No acute osseous abnormality. Shallow lung volumes with reticular airspace opacities at both lung bases. Findings are nonspecific and may be on the basis of atelectasis multifocal pneumonia.      Cta Pulmonary W Contrast    Result Date: 4/24/2021  EXAMINATION: CTA OF THE CHEST 4/24/2021 11:54 am TECHNIQUE: CTA of the chest was performed after the administration of intravenous contrast. Multiplanar reformatted images are provided for review. MIP images are provided for review. Dose modulation, iterative reconstruction, and/or weight based adjustment of the mA/kV was utilized to reduce the radiation dose to as low as reasonably achievable. COMPARISON: None. HISTORY: ORDERING SYSTEM PROVIDED HISTORY: possible PE TECHNOLOGIST PROVIDED HISTORY: Reason for exam:->possible PE Decision Support Exception->Emergency Medical Condition (MA) FINDINGS: Pulmonary Arteries: Pulmonary arteries are adequately opacified for evaluation. No evidence of intraluminal filling defect to suggest pulmonary embolism. Main pulmonary artery is normal in caliber. Mediastinum: No evidence of mediastinal lymphadenopathy. The heart and pericardium demonstrate no acute abnormality. There is no acute abnormality of the thoracic aorta. Lungs/pleura: There are mild hazy opacity in both lungs with mild infiltrates at the posterior lung bases. No evidence of pleural effusion or pneumothorax. Upper Abdomen: Limited images of the upper abdomen are unremarkable. Soft Tissues/Bones: No acute bone or soft tissue abnormality. No evidence of pulmonary embolism. Mild cardiomegaly and suspect interstitial edema. EKG: See Report  Echo: See Report  Preliminary Catheterization Report:  RCA-85% stenosis in mid segment before takeoff of posterior ventricular and posterior descending branches. 3.0 x 18 mm KATYA deployed with 0% residual narrowing. LCA-mild luminal wall irregularities LAD with 100% occlusion of mid circumflex trunk without adequate visualization of distal anatomy. Successful attempt to recannulized circumflex trunk with subsequent stenting of the circumflex trunk in its mid and distal third segment. KATYA deployed x2. Left ventriculogram-decreased left ventricular systolic function with estimated ejection fraction 50%. (Full report to follow).     IMPRESSIONS:  Principal Problem:    CAD in native artery  Active Problems:    NSTEMI, initial episode of care [facility])  Resolved Problems:    * No resolved hospital problems. *      RECOMMENDATIONS:  Continue present medications for coronary artery disease and left ventricular dysfunction as well as prophylaxis with antiplatelet agents. He appears to be sensitive to ticagrelor and thus will switch him to prasugrel and aspirin. If stable, will discharge tomorrow    I have spent more than 30 minutes face to face with Mariann Handing and reviewing notes and laboratory data, with greater than 50% of this time instructing and counseling the patient face to face regarding my findings and recommendations and I have answered all questions as posed to me by Mr. Herlinda Jurado. Oswaldo Smith, DO FACP,FACC,FSCAI      NOTE:  This report was transcribed using voice recognition software.   Every effort was made to ensure accuracy; however, inadvertent computerized transcription errors may be present

## 2021-04-27 NOTE — PROGRESS NOTES
PROGRESS NOTE       PATIENT PROBLEM LIST:  Principal Problem:    CAD in native artery  Active Problems:    Acute systolic (congestive) heart failure (HCC)    NSTEMI, initial episode of care ([facility] [address]    Hypertension    H/O heart artery stent    Hypercholesterolemia with target low density lipoprotein (LDL) cholesterol less than 70 mg/dL  Resolved Problems:    * No resolved hospital problems. *      SUBJECTIVE:  Suzette Guardado feels significantly better and has no further chest discomfort or burning in his lower extremities as well. In retrospect he now notes that he has been feeling normal for least 6 months did not recognize his symptoms. He is also admitted himself to smoking cessation. REVIEW OF SYSTEMS:  General ROS: negative for - fatigue, malaise,  weight gain or weight loss  Psychological ROS: negative for - anxiety , depression  Ophthalmic ROS: positive for - decreased vision and utilizes corrective lenses for visual acuity. ENT ROS: negative  Allergy and Immunology ROS: negative  Hematological and Lymphatic ROS: negative  Endocrine: no heat or cold intolerance and no polyphagia, polydipsia, or polyuria  Respiratory ROS: positive for - shortness of breath-resolved  Cardiovascular ROS: positive for - chest pain, dyspnea on exertion and shortness of breath-resolved. Gastrointestinal ROS: no abdominal pain, change in bowel habits, or black or bloody stools  Genito-Urinary ROS: no nocturia, dysuria, trouble voiding, frequency or hematuria  Musculoskeletal ROS: negative for- myalgias, arthralgias, or claudication  Neurological ROS: no TIA or stroke symptoms otherwise no significant change in symptoms or problems since yesterday as documented in previous progress notes.     SCHEDULED MEDICATIONS:   sodium chloride flush  5-40 mL Intravenous 2 times per day    rosuvastatin  20 mg Oral Daily    [START ON 4/28/2021] prasugrel  10 mg Oral Daily    aspirin  81 mg Oral Daily    metoprolol tartrate  12.5 mg Oral BID    nitroglycerin  1 inch Topical Q8H    pantoprazole  40 mg Oral BID AC    enoxaparin  40 mg Subcutaneous Daily       VITAL SIGNS:                                                                                                                          /64   Pulse 65   Temp 97.1 °F (36.2 °C) (Temporal)   Resp 16   Ht 5' 9\" (1.753 m)   Wt 237 lb 6.4 oz (107.7 kg)   SpO2 98%   BMI 35.06 kg/m²   Patient Vitals for the past 96 hrs (Last 3 readings):   Weight   04/27/21 0644 237 lb 6.4 oz (107.7 kg)   04/26/21 1247 250 lb (113.4 kg)     OBJECTIVE:    HEENT: PERRL, EOM  Intact; sclera non-icteric, conjunctiva pink. Carotids are brisk in upstroke with normal contour. No carotid bruits. Normal jugular venous pulsation at 45°. No palpable cervical nor supraclavicular nodes. Thyroid not palpable. Trachea midline. Chest: Even excursion  Lungs: CTA B, no expiratory wheezes or rhonchi, no decreased tactile fremitus without inspiratory rales. Heart: Regular  rhythm; S1 > S2, no gallop or murmur. No clicks, rub, palpable thrills   or heaves. PMI nondisplaced, 5th intercostal space MCL. Abdomen: Soft, nontender, nondistended,  moderately protuberant, no masses or organomegaly. Bowel sounds active. Extremities: Without clubbing, cyanosis or edema. Pulses present 3+ upper extermities bilaterally; present 2+ DP and present 2+ PT bilaterally.      Data:   Scheduled Meds: Reviewed  Continuous Infusions:    sodium chloride Stopped (04/27/21 0625)       Intake/Output Summary (Last 24 hours) at 4/27/2021 1512  Last data filed at 4/27/2021 1311  Gross per 24 hour   Intake 1640 ml   Output 2300 ml   Net -660 ml     CBC:   Recent Labs     04/25/21 0357 04/26/21 0339   WBC 21.1* 8.2   HGB 14.5 13.0   HCT 41.5 38.6    282     BMP:  Recent Labs     04/25/21  0357 04/26/21  0339 04/27/21  0606    136 136   K 3.9 4.3 4.5    103 104   CO2 21* 24 21*   BUN 13 11 10   CREATININE 0.9 0.9 0.9   LABGLOM >60 >60 >60 ABGs: No results found for: PH, PO2, PCO2  INR: No results for input(s): INR in the last 72 hours. PRO-BNP:   Lab Results   Component Value Date    PROBNP 1,826 (H) 04/25/2021    PROBNP 1,447 (H) 04/24/2021      TSH:   Lab Results   Component Value Date    TSH 0.869 04/25/2021      Cardiac Injury Profile:   Recent Labs     04/24/21  1719 04/24/21  2328   TROPONINI 0.29* 0.32*      Lipid Profile:   Lab Results   Component Value Date    TRIG 134 04/25/2021    HDL 28 04/25/2021    LDLCALC 120 04/25/2021    CHOL 175 04/25/2021      Hemoglobin A1C: No components found for: HGBA1C     RAD:   Echo Complete    Result Date: 4/25/2021  Transthoracic Echocardiography Report (TTE)  Demographics   Patient Name        Rosario Olsen   Gender                Male   Medical Record      [id number]    Room Number           4397  Number   Account #           [de-identified]   Procedure Date        04/25/2021   Corporate ID                    Ordering Physician    Mathew Torres   Accession Number    [id number]  Referring Physician   Marty Gaviria   Date of Birth       1975  Sonographer           [facility]   Age                 50 year(s)  Interpreting          Adonna Schaumann DO                                  Physician                                   Any Other  Procedure Type of Study   TTE procedure:Echo Complete W/Doppler & Color Flow. Procedure Date Date: 04/25/2021 Start: 07:40 AM Study Location: Portable Technical Quality: Poor visualization due to body habitus. Indications:Congestive heart failure. Patient Status: Routine Contrast Medium: Definity. Height: 69 inches Weight: 247 pounds BSA: 2.26 m^2 BMI: 36.48 kg/m^2 HR: 106 bpm BP: 112/68 mmHg  Findings   Left Ventricle  Left ventricle mildly dilated. Inferolateral hypokinesis moderate . Lateral wall hypokinesis, mild. Estimated left ventricular ejection fraction is 50±5%. Cannot accurately characterize diastolic function secondary to mitral  valve disease.    Right Ventricle  Mildly dilated right ventricle. TAPSE is normal   Left Atrium  The left atrium is mild-moderately dilated. Interatrial septum appears intact. The LAESV Index is >34 ml/m2. Right Atrium  Right atrium is normal in size. Mitral Valve  Structurally normal mitral valve. Mild mitral regurgitation is present. Tricuspid Valve  The tricuspid valve appears structurally normal.  Mild to moderate tricuspid regurgitation. RVSP is 51 mmHg. Pulmonary hypertension is mild to moderate . Aortic Valve  The aortic valve is trileaflet. Aortic valve opens well. The aortic valve appears mildly sclerotic. No doppler evidence of aortic stenosis or regurgitation. Pulmonic Valve  Pulmonic valve is structurally normal.  No evidence of pulmonic regurgitation. Pericardial Effusion  No evidence of pericardial thickening/calcification present. There is a small pericardial effusion. Aorta  Aortic root dimension within normal limits. Miscellaneous  Dilated Inferior Vena Cava. Inferior Vena Cava, normal respiratory variation. Conclusions   Summary  Left ventricle mildly dilated. Inferolateral hypokinesis moderate . Lateral wall hypokinesis, mild. Estimated left ventricular ejection fraction is 50±5%. Cannot accurately characterize diastolic function secondary to mitral  valve disease. The left atrium is mild-moderately dilated. Mildly dilated right ventricle. TAPSE is normal  Structurally normal mitral valve. Mild mitral regurgitation is present. Mild to moderate tricuspid regurgitation. RVSP is 51 mmHg. Pulmonary hypertension is mild to moderate . There is a small pericardial effusion. Technically fair quality study. Technically difficult study due to patient''s body habitus. No comparison study available. Suggest clinical correlation.    Signature   ----------------------------------------------------------------  Electronically signed by Lois Wang DO(Interpreting  physician) on 04/25/2021 06:54 PM  ----------------------------------------------------------------  M-Mode/2D Measurements & Calculations   LV Diastolic    LV Systolic Dimension: 4.8   AV Cusp Separation: 1.7 cmLA  Dimension: 6.4  cm                           Dimension: 5.2 cmAO Root  cm              LV Volume Diastolic: 153 ml  Dimension: 3.3 cm  LV FS:25 %      LV Volume Systolic: 806.2 ml  LV PW           LV EDV/LV EDV Index: 479  Diastolic: 1.1  UT/51 BG/F^1XG ESV/LV ESV  cm              Index: 106.2 ml/47ml/ m^2    RV Diastolic Dimension: 3.5  LV PW Systolic: EF Calculated: 90.7 %        cm  1.3 cm          LV Mass Index: 122 l/min*m^2  Septum          LV Length: 9.7 cm            LA/Aorta: 0.04  Diastolic: 0.9                               Ascending Aorta: 3 cm  cm              LVOT: 2.1 cm                 LA volume/Index: 120 ml  Septum                                       /46.11NE/P^8  Systolic: 1.3                                RA Area: 18.4 cm^2  cm  CO: 8.92 l/min                               IVC Expiration: 2.1 cm  CI: 3.95  l/m*m^2  LV Mass: 275.63  g  Doppler Measurements & Calculations   MV Peak E-Wave: 1.11 m/s   AV Peak Velocity:    LVOT Peak Velocity: 1.45  MV Peak A-Wave: 0.71 m/s   2.07 m/s             m/s  MV E/A Ratio: 1.56         AV Peak Gradient:    LVOT Mean Velocity: 0.85  MV Peak Gradient: 5.6 mmHg 17.19 mmHg           m/s  MV Mean Gradient: 2.2 mmHg AV Mean Velocity:    LVOT Peak Gradient: 8.4  MV Mean Velocity: 0.69 m/s 1.39 m/s             mmHgLVOT Mean Gradient:  MV Deceleration Time:      AV Mean Gradient:    3.7 mmHg  152.6 msec                 8.8 mmHg             Estimated RVSP: 50.7 mmHg  MV P1/2t: 64.1 msec        AV VTI: 34.7 cm      Estimated RAP:8 mmHg  MVA by PHT:3.43 cm^2       AV Area  MV Area (continuity): 3.1  (Continuity):2.42  cm^2                       cm^2                 TR Velocity:3.27 m/s  MV E' Septal Velocity: 0.1                      TR Gradient:42.69 mmHg  m/s LVOT VTI: 24.3 cm    PV Peak Velocity: 1.28 m/s  MV E' Lateral Velocity: 11 IVRT: 50.7 msec      PV Peak Gradient: 6.56  m/s                        Estimated PASP:      mmHg  MR Velocity: 4.89 m/s      50.69 mmHg           PV Mean Velocity: 0.85 m/s  MV ZENIA PISA: 0.13 cm^2                          PV Mean Gradient: 3.3 mmHg  MR VTI: 128.5 cm  Alias Velocity: 0.21  m/sPISA Radius: 0.7 cm   PISA area: 3.08 cm^2MR  flow rate: 63.14 ml/sMR  volume:16.7 ml  [email or web address] [id number]    Ct Abdomen Pelvis Wo Contrast Additional Contrast? None    Result Date: 4/24/2021  EXAMINATION: CT OF THE ABDOMEN AND PELVIS WITHOUT CONTRAST 4/24/2021 10:54 am TECHNIQUE: CT of the abdomen and pelvis was performed without the administration of intravenous contrast. Multiplanar reformatted images are provided for review. Dose modulation, iterative reconstruction, and/or weight based adjustment of the mA/kV was utilized to reduce the radiation dose to as low as reasonably achievable. COMPARISON: CT a chest 04/24/2021 HISTORY: ORDERING SYSTEM PROVIDED HISTORY: general abd pain TECHNOLOGIST PROVIDED HISTORY: Reason for exam:->general abd pain Additional Contrast?->None Decision Support Exception->Emergency Medical Condition (MA) FINDINGS: Lower Chest: In the lung bases there is bilateral septal thickening. There are bilateral posterior ground-glass airspace opacities which may be related to edema or infectious process. Organs: There is diffuse low-attenuation of the liver, compatible with fatty infiltration. The gallbladder is present. The spleen, pancreas, adrenal glands, kidneys are unremarkable. There is no hydronephrosis or urolithiasis. GI/Bowel: There is circumferential wall thickening and inflammation of the duodenum, compatible with acute duodenitis. No extraluminal air is identified. No bowel obstruction.   No appendiceal inflammation. Pelvis: Urinary bladder is unremarkable in CT appearance. Peritoneum/Retroperitoneum: No free fluid or free air. Aorta is normal in caliber. Bones/Soft Tissues: No acute abnormality. There are degenerative disc changes at L4-5. Acute inflammatory changes of the duodenum, compatible with duodenitis. Bilateral lung base ground-glass airspace opacities and septal thickening can be seen with edema. Findings favored less likely to be related to pneumonia. Recommend radiographic follow-up. Us Gallbladder Ruq    Result Date: 4/24/2021  EXAMINATION: RIGHT UPPER QUADRANT ULTRASOUND 4/24/2021 9:11 am COMPARISON: None. HISTORY: ORDERING SYSTEM PROVIDED HISTORY: PAIN TECHNOLOGIST PROVIDED HISTORY: Reason for exam:->PAIN What reading provider will be dictating this exam?->CRC FINDINGS: LIVER:  There is increased echogenicity of the liver, which can be seen with fatty infiltration. Liver measures up to 19.8 cm craniocaudal. BILIARY SYSTEM:  There is no evidence of cholelithiasis or gallbladder wall thickening. Common bile duct is within normal limits measuring 5-6 mm. RIGHT KIDNEY: The right kidney is grossly unremarkable without evidence of hydronephrosis. PANCREAS: Not well visualized OTHER: No evidence of right upper quadrant ascites. There is no sonographic evidence of cholelithiasis or cholecystitis. No biliary ductal dilatation. Fatty infiltration of the liver     Xr Chest Portable    Result Date: 4/24/2021  EXAMINATION: ONE XRAY VIEW OF THE CHEST 4/24/2021 8:40 am COMPARISON: None. HISTORY: ORDERING SYSTEM PROVIDED HISTORY: SOB TECHNOLOGIST PROVIDED HISTORY: Reason for exam:->SOB FINDINGS: Lung volumes are shallow and there is elevation of the left hemidiaphragm. There are faint linear/reticular airspace opacities at both lung bases. No evidence of a sizable pleural effusion. No pneumothorax.   Heart size is unable to be accurately assessed on this single portable view of the chest. No acute osseous abnormality. Shallow lung volumes with reticular airspace opacities at both lung bases. Findings are nonspecific and may be on the basis of atelectasis multifocal pneumonia. Cta Pulmonary W Contrast    Result Date: 4/24/2021  EXAMINATION: CTA OF THE CHEST 4/24/2021 11:54 am TECHNIQUE: CTA of the chest was performed after the administration of intravenous contrast.  Multiplanar reformatted images are provided for review. MIP images are provided for review. Dose modulation, iterative reconstruction, and/or weight based adjustment of the mA/kV was utilized to reduce the radiation dose to as low as reasonably achievable. COMPARISON: None. HISTORY: ORDERING SYSTEM PROVIDED HISTORY: possible PE TECHNOLOGIST PROVIDED HISTORY: Reason for exam:->possible PE Decision Support Exception->Emergency Medical Condition (MA) FINDINGS: Pulmonary Arteries: Pulmonary arteries are adequately opacified for evaluation. No evidence of intraluminal filling defect to suggest pulmonary embolism. Main pulmonary artery is normal in caliber. Mediastinum: No evidence of mediastinal lymphadenopathy. The heart and pericardium demonstrate no acute abnormality. There is no acute abnormality of the thoracic aorta. Lungs/pleura: There are mild hazy opacity in both lungs with mild infiltrates at the posterior lung bases. No evidence of pleural effusion or pneumothorax. Upper Abdomen: Limited images of the upper abdomen are unremarkable. Soft Tissues/Bones: No acute bone or soft tissue abnormality. No evidence of pulmonary embolism. Mild cardiomegaly and suspect interstitial edema. EKG: See Report  Echo: See Report  Preliminary Catheterization Report:  RCA-85% stenosis in mid segment before takeoff of posterior ventricular and posterior descending branches. 3.0 x 18 mm KATYA deployed with 0% residual narrowing.   LCA-mild luminal wall irregularities LAD with 100% occlusion of mid circumflex trunk without adequate visualization of distal anatomy. Successful attempt to recannulized circumflex trunk with subsequent stenting of the circumflex trunk in its mid and distal third segment. KATYA deployed x2. Left ventriculogram-decreased left ventricular systolic function with estimated ejection fraction 50%. (Full report to follow). IMPRESSIONS:  Principal Problem:    CAD in native artery  Active Problems:    Acute systolic (congestive) heart failure (HCC)    NSTEMI, initial episode of care ([facility] [address]    Hypertension    H/O heart artery stent    Hypercholesterolemia with target low density lipoprotein (LDL) cholesterol less than 70 mg/dL  Resolved Problems:    * No resolved hospital problems. *      RECOMMENDATIONS:  I have reviewed discharge instructions in detail with Mr. Rin Romero and have emphasized the need for him to maintain his LDL cholesterol at <70 mg/dL as delineated and updated 2020 ACC/AHA/AACE/ESC/EAS cholesterol guidelines. likewise I reviewed the reasons why to discontinue smoking relative to his present health status. He was instructed to follow-up with his primary physician, Dr. Corinna Garcia and should have a lipid profile obtained approximately 4-6 weeks from now and I will see him again in 3 months or sooner as clinically warranted. 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 I have spent more than 26 minutes face to face with Mercedes Galan and reviewing notes and laboratory data, with greater than 50% of this time instructing and counseling the patient face to face regarding my findings and recommendations and I have answered all questions as posed to me by Mr. Rin Romero. Eve Ozuna, DO FACP,FACC,FSCAI      NOTE:  This report was transcribed using voice recognition software.   Every effort was made to ensure accuracy; however, inadvertent computerized transcription errors may be present

## 2021-04-29 LAB
BLOOD CULTURE, ROUTINE: NORMAL
CULTURE, BLOOD 2: NORMAL

## 2021-04-29 NOTE — DISCHARGE SUMMARY
DISCHARGE SUMMARY  Patient ID:  Cheryl Brasher  [id number]  50 y.o.  1975    Admit date: 4/24/2021      Discharge date : 4/26/2021      Admission Diagnoses: Acute systolic (congestive) heart failure ([facility] [I50.21]    Discharge Diagnosis  Principal Problem:    NSTEMI, initial episode of care [facility])  Active Problems:    Acute systolic (congestive) heart failure (HCC)    Chest pain due to myocardial ischemia    Coronary artery disease involving native coronary artery with unstable angina pectoris ([facility]    Hypertension    H/O heart artery stent    Osteoarthritis cervical spine    Osteoarthritis of lumbar spine  Resolved Problems:    * No resolved hospital problems. *      Hospital Course:     CHIEF COMPLAINT: Chest pain/burning, short of breath     History of Present Illness: 24-year-old male patient of Dr. Felipe Wellington am asked admit and follow. History obtained from patient and electronic record. Patient states approximately 2 weeks ago he was having left-sided burning in the chest associate with shortness of breath but no diaphoresis no radiation no nausea. He was seen in a different ED and discharged. Over the last 2 weeks symptoms would come and go worse with exertion better with rest.  Over the last 3 days symptoms became more intense he presented to the ED. In the ED troponin 0 0.41 with a proBNP of 1447. EKG revealed remote inferior infarct but no acute changes; frequent PVCs. He underwent CTA of chest which revealed no pulmonary embolus; there was mild cardiomegaly suspected interstitial edema. Ultrasound of gallbladder unremarkable. CT of abdomen with following results--          FINDINGS:   Lower Chest: In the lung bases there is bilateral septal thickening.  There   are bilateral posterior ground-glass airspace opacities which may be related   to edema or infectious process. Organs: There is diffuse low-attenuation of the liver, compatible with fatty   infiltration.  The gallbladder is present.  The spleen, pancreas, adrenal   glands, kidneys are unremarkable. Josiephine Payer is no hydronephrosis or urolithiasis. GI/Bowel: There is circumferential wall thickening and inflammation of the   duodenum, compatible with acute duodenitis.  No extraluminal air is   identified.  No bowel obstruction.  No appendiceal inflammation. Pelvis: Urinary bladder is unremarkable in CT appearance. Peritoneum/Retroperitoneum: No free fluid or free air.  Aorta is normal in   caliber. Bones/Soft Tissues: No acute abnormality. There are degenerative disc changes   at L4-5.        Impression:       Acute inflammatory changes of the duodenum, compatible with duodenitis. Bilateral lung base ground-glass airspace opacities and septal thickening can   be seen with edema.  Findings favored       Patient had cardiac catheterization and 2 stents inserted at [facility] in [facility] . Since that time he has had no further difficulties. At that time he was found to have hypertension was on medication short period of time eventually stop the medicine. He states he checks his blood pressure at home on nearly a daily basis never higher than 130. --Past medical history negative rheumatic fever scarlet fever polio diphtheria cancer diabetes; hypertension as above  --Past surgical history includes colonoscopy  as well as hemorrhoid surgery x2+ the above cardiac catheterization  --Patient began smoking approximately age 11 continues 1 pack a day; states he is try to quit many times but cannot. Extensive education regarding relationship between smoking and coronary disease.   --Mother alive age 66 with \"chronic lung issues since she was young but no emphysema no cancer; father alive 68 healthy; paternal uncle  age 39 acute MI.  --He admits to occasional bronchitis/pneumonia perhaps once a year  --Recent indigestion no history of peptic ulcer  --Denies any kidney stones kidney infections 4/26/2021-SUBJECTIVE: Dany King is alert awake and cooperative; oriented ×3. Denies any  dyspnea nausea emesis. No significant chest burning or pain overnight. Tolerating diet. Less epigastric discomfort. Scheduled for cardiac catheterization this afternoon; will transfer to [facility]. Patient having no nicotine withdrawal symptoms; does not think he needs nicotine patch at this time. Afebrile last 24 hours. Blood pressure 116/63.  95% saturation on room air. Intake and output -3425 cc. Glucose 108 . Liver functions normal.  A1c 5.1.  TSH 0.869. Hemoglobin 13.0 WBC 8.2. Platelets 748. The last troponin, 0.32. CRP 3.2. E06 folic acid normal.  Sed rate 30. Blood cultures negative to date. Urine culture no growth still incubating. SARS-CoV-2 not detected. Instructions at discharge:    RE  CHANGES AND FINDINGS   Medications reviewed with patient  GI prophylaxis  DVT prophylaxis  Consultants notes reviewed   Aerosol treatments  Aspirin 81 mg daily  Nitroglycerin ointment 1 inch every 8 hours topical  Metoprolol tartrate 12.5 mg twice daily  Rosuvastatin 10 mg daily  Transfer [facility] for cardiac catheterization  Follow-up with surgery regarding possible peptic ulcer after cardiac catheterization  Need to quit smoking  Nicotine patch 21 mg/day      Condition at DISCHARGE : [place] 1878     Discharged to: [facility], cardiac catheterization    Discharge Instructions: Medications reviewed with patient    Consults:cardiology     Past Medical Hx :   Past Medical History:   Diagnosis Date    Chest pain due to myocardial ischemia 04/25/2021    CHF (congestive heart failure) ([facility]     Coronary artery disease involving native coronary artery with unstable angina pectoris ([facility]     H/O heart artery stent 2013    X2;  [facility]    Hypercholesterolemia with target low density lipoprotein (LDL) cholesterol less than 70 mg/dL 4/26/2021    Hypertension     NSTEMI, initial episode of care [facility]) 04/25/2021    Osteoarthritis cervical spine     Osteoarthritis of lumbar spine        Past Surgical Hx :  Past Surgical History:   Procedure Laterality Date    CARDIAC CATHETERIZATION  2013    [id number] [facility]    COLONOSCOPY  2015    Findings unknown    CORONARY ANGIOPLASTY WITH STENT PLACEMENT  04/26/2021    Dr. Pal Joseph- KATYA XIENCE BALBINA 3.25 x 15 RCA, XIENCE BALBINA 3.0 x 18, 3.0 x 12 CX    HEMORRHOID SURGERY      X2    PTCA  2013    X2, stents, [address]:   CBC:   Lab Results   Component Value Date    WBC 8.2 04/26/2021    RBC 4.09 04/26/2021    HGB 13.0 04/26/2021    HCT 38.6 04/26/2021    MCV 94.4 04/26/2021    MCH 31.8 04/26/2021    MCHC 33.7 04/26/2021    RDW 12.3 04/26/2021     04/26/2021    MPV 11.1 04/26/2021     CBC with Differential:    Lab Results   Component Value Date    WBC 8.2 04/26/2021    RBC 4.09 04/26/2021    HGB 13.0 04/26/2021    HCT 38.6 04/26/2021     04/26/2021    MCV 94.4 04/26/2021    MCH 31.8 04/26/2021    MCHC 33.7 04/26/2021    RDW 12.3 04/26/2021    LYMPHOPCT 30.4 04/26/2021    MONOPCT 7.4 04/26/2021    BASOPCT 1.3 04/26/2021    MONOSABS 0.61 04/26/2021    LYMPHSABS 2.49 04/26/2021    EOSABS 0.17 04/26/2021    BASOSABS 0.11 04/26/2021     Hemoglobin/Hematocrit:    Lab Results   Component Value Date    HGB 13.0 04/26/2021    HCT 38.6 04/26/2021     CMP:    Lab Results   Component Value Date     04/27/2021    K 4.5 04/27/2021     04/27/2021    CO2 21 04/27/2021    BUN 10 04/27/2021    CREATININE 0.9 04/27/2021    GFRAA >60 04/27/2021    LABGLOM >60 04/27/2021    GLUCOSE 91 04/27/2021    PROT 6.1 04/26/2021    LABALBU 3.5 04/26/2021    CALCIUM 9.2 04/27/2021    BILITOT 0.5 04/26/2021    ALKPHOS 88 04/26/2021    AST 13 04/26/2021    ALT 16 04/26/2021     BMP:    Lab Results   Component Value Date Lab Results   Component Value Date    TSH 0.869 04/25/2021     VITAMIN B12: No components found for: B12  FOLATE:    Lab Results   Component Value Date    FOLATE 6.5 04/25/2021          CBC:No results for input(s): WBC, RBC, HGB, HCT, PLT, MCV, MCH, MCHC, RDW, NRBC, SEGSPCT, BANDSPCT, BLASTSPCT, METASPCT, LYMPHOPCT, PROMYELOPCT, MONOPCT, MYELOPCT, EOSPCT, BASOPCT, MONOSABS, LYMPHSABS, EOSABS, BASOSABS, DIFFTYPE in the last 72 hours. Invalid input(s): ATYLMREL       H & H :No results for input(s): HGB in the last 72 hours. TSH:No results for input(s): TSH in the last 72 hours. GLUCOSE:No results for input(s): POCGLU in the last 72 hours. CMP:  Recent Labs     04/27/21  0606      K 4.5      CO2 21*   BUN 10   CREATININE 0.9   GFRAA >60   LABGLOM >60   GLUCOSE 91   CALCIUM 9.2         BNP:No results found for: BNP    PROTIME/INR:No results for input(s): PROTIME, INR in the last 72 hours. CRP: No results for input(s): CRP in the last 72 hours. ESR:No results for input(s): SEDRATE in the last 72 hours. LIPASE , AMYLASE:  Lab Results   Component Value Date    LIPASE 27 04/26/2021      Lab Results   Component Value Date    AMYLASE 50 04/24/2021       ABGs: No results found for: PHART, PO2ART, WGL9EGU    CARDIAC: No results for input(s): CKTOTAL, CKMB, CKMBINDEX, TROPONINI in the last 72 hours.     Lipid Profile:   Lab Results   Component Value Date    TRIG 134 04/25/2021    HDL 28 04/25/2021    LDLCALC 120 04/25/2021    CHOL 175 04/25/2021        Echo Complete    Result Date: 4/25/2021  Transthoracic Echocardiography Report (TTE)  Demographics   Patient Name        Cam Real   Gender                Male   Medical Record      [id number]    Room Number           4935  Number   Account #           [de-identified]   Procedure Date        04/25/2021   Corporate ID                    Ordering Physician    Opal Chou   Accession Number    [id number]  Referring Physician   Alcon Montes   Date of Birth 1975  Sonographer           Rich Najera [facility]   Age                 50 year(s)  Interpreting          Ji Bill DO                                  Physician                                   Any Other  Procedure Type of Study   TTE procedure:Echo Complete W/Doppler & Color Flow. Procedure Date Date: 04/25/2021 Start: 07:40 AM Study Location: Portable Technical Quality: Poor visualization due to body habitus. Indications:Congestive heart failure. Patient Status: Routine Contrast Medium: Definity. Height: 69 inches Weight: 247 pounds BSA: 2.26 m^2 BMI: 36.48 kg/m^2 HR: 106 bpm BP: 112/68 mmHg  Findings   Left Ventricle  Left ventricle mildly dilated. Inferolateral hypokinesis moderate . Lateral wall hypokinesis, mild. Estimated left ventricular ejection fraction is 50±5%. Cannot accurately characterize diastolic function secondary to mitral  valve disease. Right Ventricle  Mildly dilated right ventricle. TAPSE is normal   Left Atrium  The left atrium is mild-moderately dilated. Interatrial septum appears intact. The LAESV Index is >34 ml/m2. Right Atrium  Right atrium is normal in size. Mitral Valve  Structurally normal mitral valve. Mild mitral regurgitation is present. Tricuspid Valve  The tricuspid valve appears structurally normal.  Mild to moderate tricuspid regurgitation. RVSP is 51 mmHg. Pulmonary hypertension is mild to moderate . Aortic Valve  The aortic valve is trileaflet. Aortic valve opens well. The aortic valve appears mildly sclerotic. No doppler evidence of aortic stenosis or regurgitation. Pulmonic Valve  Pulmonic valve is structurally normal.  No evidence of pulmonic regurgitation. Pericardial Effusion  No evidence of pericardial thickening/calcification present. There is a small pericardial effusion. Aorta  Aortic root dimension within normal limits. Miscellaneous  Dilated Inferior Vena Cava. Inferior Vena Cava, normal respiratory variation. Conclusions   Summary  Left ventricle mildly dilated. Inferolateral hypokinesis moderate . Lateral wall hypokinesis, mild. Estimated left ventricular ejection fraction is 50±5%. Cannot accurately characterize diastolic function secondary to mitral  valve disease. The left atrium is mild-moderately dilated. Mildly dilated right ventricle. TAPSE is normal  Structurally normal mitral valve. Mild mitral regurgitation is present. Mild to moderate tricuspid regurgitation. RVSP is 51 mmHg. Pulmonary hypertension is mild to moderate . There is a small pericardial effusion. Technically fair quality study. Technically difficult study due to patient''s body habitus. No comparison study available. Suggest clinical correlation.    Signature   ----------------------------------------------------------------  Electronically signed by Leela Crenshaw DO(Interpreting  physician) on 04/25/2021 06:54 PM  ----------------------------------------------------------------  M-Mode/2D Measurements & Calculations   LV Diastolic    LV Systolic Dimension: 4.8   AV Cusp Separation: 1.7 cmLA  Dimension: 6.4  cm                           Dimension: 5.2 cmAO Root  cm              LV Volume Diastolic: 173 ml  Dimension: 3.3 cm  LV FS:25 %      LV Volume Systolic: 514.4 ml  LV PW           LV EDV/LV EDV Index: 339  Diastolic: 1.1  RB/66 CC/L^8CY ESV/LV ESV  cm              Index: 106.2 ml/47ml/ m^2    RV Diastolic Dimension: 3.5  LV PW Systolic: EF Calculated: 42.6 %        cm  1.3 cm          LV Mass Index: 122 l/min*m^2  Septum          LV Length: 9.7 cm            LA/Aorta: 6.20  Diastolic: 0.9                               Ascending Aorta: 3 cm  cm              LVOT: 2.1 cm                 LA volume/Index: 120 ml  Septum                                       /19.92PF/O^0  Systolic: 1.3                                RA Area: 18.4 cm^2  cm  CO: 8.92 l/min                               IVC Expiration: 2.1 cm  CI: 3.95  l/m*m^2  LV Mass: 275.63  g  Doppler Measurements & Calculations   MV Peak E-Wave: 1.11 m/s   AV Peak Velocity:    LVOT Peak Velocity: 1.45  MV Peak A-Wave: 0.71 m/s   2.07 m/s             m/s  MV E/A Ratio: 1.56         AV Peak Gradient:    LVOT Mean Velocity: 0.85  MV Peak Gradient: 5.6 mmHg 17.19 mmHg           m/s  MV Mean Gradient: 2.2 mmHg AV Mean Velocity:    LVOT Peak Gradient: 8.4  MV Mean Velocity: 0.69 m/s 1.39 m/s             mmHgLVOT Mean Gradient:  MV Deceleration Time:      AV Mean Gradient:    3.7 mmHg  152.6 msec                 8.8 mmHg             Estimated RVSP: 50.7 mmHg  MV P1/2t: 64.1 msec        AV VTI: 34.7 cm      Estimated RAP:8 mmHg  MVA by PHT:3.43 cm^2       AV Area  MV Area (continuity): 3.1  (Continuity):2.42  cm^2                       cm^2                 TR Velocity:3.27 m/s  MV E' Septal Velocity: 0.1                      TR Gradient:42.69 mmHg  m/s                        LVOT VTI: 24.3 cm    PV Peak Velocity: 1.28 m/s  MV E' Lateral Velocity: 11 IVRT: 50.7 msec      PV Peak Gradient: 6.56  m/s                        Estimated PASP:      mmHg  MR Velocity: 4.89 m/s      50.69 mmHg           PV Mean Velocity: 0.85 m/s  MV ZENIA PISA: 0.13 cm^2                          PV Mean Gradient: 3.3 mmHg  MR VTI: 128.5 cm  Alias Velocity: 0.21  m/sPISA Radius: 0.7 cm   PISA area: 3.08 cm^2MR  flow rate: 63.14 ml/sMR  volume:16.7 ml  [email or web address] [id number]    Ct Abdomen Pelvis Wo Contrast Additional Contrast? None    Result Date: 4/24/2021  EXAMINATION: CT OF THE ABDOMEN AND PELVIS WITHOUT CONTRAST 4/24/2021 10:54 am TECHNIQUE: CT of the abdomen and pelvis was performed without the administration of intravenous contrast. Multiplanar reformatted images are provided for review.  Dose modulation, iterative reconstruction, and/or weight based adjustment of the mA/kV was utilized to reduce the radiation dose to as low as reasonably achievable. COMPARISON: CT a chest 04/24/2021 HISTORY: ORDERING SYSTEM PROVIDED HISTORY: general abd pain TECHNOLOGIST PROVIDED HISTORY: Reason for exam:->general abd pain Additional Contrast?->None Decision Support Exception->Emergency Medical Condition (MA) FINDINGS: Lower Chest: In the lung bases there is bilateral septal thickening. There are bilateral posterior ground-glass airspace opacities which may be related to edema or infectious process. Organs: There is diffuse low-attenuation of the liver, compatible with fatty infiltration. The gallbladder is present. The spleen, pancreas, adrenal glands, kidneys are unremarkable. There is no hydronephrosis or urolithiasis. GI/Bowel: There is circumferential wall thickening and inflammation of the duodenum, compatible with acute duodenitis. No extraluminal air is identified. No bowel obstruction. No appendiceal inflammation. Pelvis: Urinary bladder is unremarkable in CT appearance. Peritoneum/Retroperitoneum: No free fluid or free air. Aorta is normal in caliber. Bones/Soft Tissues: No acute abnormality. There are degenerative disc changes at L4-5. Acute inflammatory changes of the duodenum, compatible with duodenitis. Bilateral lung base ground-glass airspace opacities and septal thickening can be seen with edema. Findings favored less likely to be related to pneumonia. Recommend radiographic follow-up. Us Gallbladder Ruq    Result Date: 4/24/2021  EXAMINATION: RIGHT UPPER QUADRANT ULTRASOUND 4/24/2021 9:11 am COMPARISON: None. HISTORY: ORDERING SYSTEM PROVIDED HISTORY: PAIN TECHNOLOGIST PROVIDED HISTORY: Reason for exam:->PAIN What reading provider will be dictating this exam?->CRC FINDINGS: LIVER:  There is increased echogenicity of the liver, which can be seen with fatty infiltration.   Liver measures up to 19.8 cm craniocaudal. BILIARY SYSTEM:  There is no evidence of cholelithiasis or gallbladder wall thickening. Common bile duct is within normal limits measuring 5-6 mm. RIGHT KIDNEY: The right kidney is grossly unremarkable without evidence of hydronephrosis. PANCREAS: Not well visualized OTHER: No evidence of right upper quadrant ascites. There is no sonographic evidence of cholelithiasis or cholecystitis. No biliary ductal dilatation. Fatty infiltration of the liver     Xr Chest Portable    Result Date: 4/24/2021  EXAMINATION: ONE XRAY VIEW OF THE CHEST 4/24/2021 8:40 am COMPARISON: None. HISTORY: ORDERING SYSTEM PROVIDED HISTORY: SOB TECHNOLOGIST PROVIDED HISTORY: Reason for exam:->SOB FINDINGS: Lung volumes are shallow and there is elevation of the left hemidiaphragm. There are faint linear/reticular airspace opacities at both lung bases. No evidence of a sizable pleural effusion. No pneumothorax. Heart size is unable to be accurately assessed on this single portable view of the chest. No acute osseous abnormality. Shallow lung volumes with reticular airspace opacities at both lung bases. Findings are nonspecific and may be on the basis of atelectasis multifocal pneumonia. Cta Pulmonary W Contrast    Result Date: 4/24/2021  EXAMINATION: CTA OF THE CHEST 4/24/2021 11:54 am TECHNIQUE: CTA of the chest was performed after the administration of intravenous contrast.  Multiplanar reformatted images are provided for review. MIP images are provided for review. Dose modulation, iterative reconstruction, and/or weight based adjustment of the mA/kV was utilized to reduce the radiation dose to as low as reasonably achievable. COMPARISON: None. HISTORY: ORDERING SYSTEM PROVIDED HISTORY: possible PE TECHNOLOGIST PROVIDED HISTORY: Reason for exam:->possible PE Decision Support Exception->Emergency Medical Condition (MA) FINDINGS: Pulmonary Arteries: Pulmonary arteries are adequately opacified for evaluation. No evidence of intraluminal filling defect to suggest pulmonary embolism.   Main pulmonary artery is normal in caliber. Mediastinum: No evidence of mediastinal lymphadenopathy. The heart and pericardium demonstrate no acute abnormality. There is no acute abnormality of the thoracic aorta. Lungs/pleura: There are mild hazy opacity in both lungs with mild infiltrates at the posterior lung bases. No evidence of pleural effusion or pneumothorax. Upper Abdomen: Limited images of the upper abdomen are unremarkable. Soft Tissues/Bones: No acute bone or soft tissue abnormality. No evidence of pulmonary embolism. Mild cardiomegaly and suspect interstitial edema. Discharge Exam:  See progress note from today    Discharge Medication List as of 4/26/2021  3:22 PM      START taking these medications    Details   nicotine (NICODERM CQ) 21 MG/24HR Place 1 patch onto the skin daily as needed (Nicotine withdrawal), Disp-30 patch, R-3DC to SNF      pantoprazole (PROTONIX) 40 MG tablet Take 1 tablet by mouth 2 times daily (before meals), Disp-30 tablet, R-3DC to SNF      nitroglycerin (NITRO-BID) 2 % ointment Place 1 inch onto the skin every 8 hours, Disp-1 each, R-3DC to SNF      rosuvastatin (CRESTOR) 10 MG tablet Take 1 tablet by mouth nightly, Disp-30 tablet, R-3DC to SNF      metoprolol tartrate (LOPRESSOR) 25 MG tablet Take 0.5 tablets by mouth 2 times daily, Disp-60 tablet, R-3DC to SNF         CONTINUE these medications which have NOT CHANGED    Details   aspirin 81 MG chewable tablet Take 81 mg by mouth dailyHistorical Med             Time Spent on discharge is more than 30 minutes --      TIME  INCLUDES TIME THAT WAS  SPENT WITH DISCHARGE PAPERS, MEDICATION REVIEW, MEDICATION RECONCILIATION,   PRESCRIPTIONS, CHART REVIEW, PATIENT EXAM , FINAL PROGRESS NOTE, DISCUSSION OF FINDINGS WITH PATIENT AND AVAILABLE FAMILY , AND DICTATION  WHERE NEEDED ; Home Health Care [facility]) FORMS COMPLETED ; N-17  COMPLETION ;  H&P UPDATED ; DURABLE EQUIPMENT FORMS.      Active Hospital Problems    Diagnosis    Chest pain due to

## 2021-05-13 NOTE — PROCEDURES
[address]                  [address] [facility],  [facility]                            CARDIAC CATHETERIZATION    PATIENT NAME: Sunita Muse                          :        1975  MED REC NO:   [id number]                            ROOM:       6513  ACCOUNT NO:   [de-identified]                           ADMIT DATE: 2021  PROVIDER:     Vandana Burch DO    DATE OF PROCEDURE:  2021    MÓNICA LEFT HEART CATHETERIZATION:    The SCAI indicator for the heart catheterization is 4, score of 8. PRESSURES:  /76, mean 98, /38. SUMMARY INJECTIONS:  II:  Selective coronary arteriogram:  A. Right coronary artery - preponderant. A moderate-caliber vessel  with 25% segmental stenosis seen at the junction of the abraham's crook  in mid segment followed by an eccentric 75-80% stenosis seen in the mid  segment. The distal vessel was of moderate luminal caliber and widely  patent. B. Left coronary artery:  1. Left main trunk:  Normal.  2.  Left anterior descending:  Mild luminal wall irregularities noted. No areas of critical stenosis however. 3.  Circumflex:  Nondominant, moderate luminal caliber, 100% stenosis in  the mid circumflex trunk. The distal vessel was not well visualized. III. Left ventricular angiogram:  Left ventricular cavity size is  normal.  There was mild early dyskinesis seen of a small posterior basal  medial segment. Estimated left ventricular ejection fraction of 55%. CONCLUSIONS:  1. Severely stenotic native coronary atherosclerosis. 2.  Recently occluded circumflex artery. 3.  Mildly impaired left ventricular systolic function with markedly  abnormal diastolic dysfunction. CORONARY ARTERY ANGIOPLASTY REPORT    SCAI indicator 16, score 7.     CLINICAL HISTORY:  This a44-year-old gentleman presented to the [facility] following history of increasing chest discomfort  over the past 48 hours prior to admission. Following admission, his  cardiac enzymes suggested possible injury and for this reason as he  continued to have recurrent discomfort, cardiac catheterization was  recommended. Immediately prior to this procedure, cardiac  catheterization demonstrated an occluded mid circumflex trunk as well as  an eccentric severe stenosis in the mid dominant right coronary artery. Having reviewed the indications, risks and benefits of potential  coronary artery intervention with the patient prior to undergoing  cardiac catheterization, he indicated he fully understood and wished to  proceed. DESCRIPTION OF PROCEDURE:  Following initial cardiac catheterization, a  6-Tongan introducer sheath was passed into the right common femoral  artery and flushed with heparinized saline. Next, an FR-4 guiding  catheter was advanced over guidewire to the aortic root where injection  of right coronary artery was made demonstrating the eccentric 75-80%  stenosis. Bivalirudin was initiated with a bolus followed by a drip and  a PT2 angled exchange wire was passed to the distal right coronary  artery followed by a 3.0 x 12 mm balloon, which was inflated on two  occasions to 8 atmospheres. Repeat injections demonstrated modest  improvement but with greater than 25% stenosis. For this reason, a  drug-eluting 3.25 x 15 mm stent was passed into the area of stenosis and  deployed at 16 atmospheres. Repeat injections now revealed 0% residual  narrowing. For this reason, the guiding catheter was removed and  replaced with an FL-4 guiding catheter. Next, a PT2 angled exchange  wire was passed across the 100% circumflex occlusion and this was  followed by a 3.0 x 12 mm balloon, which was inflated on multiple  occasions demonstrating a moderate improvement but with still distal  narrowing in the posterolateral branch.   For this reason, a drug-eluting  stent was passed to the mid circumflex trunk and deployed at 15  atmospheres. Again a 2.5 mm x 15 mm balloon was then passed into the  proximal posterolateral marginal branch and inflated at 5 atmospheres  and again at 5 atmospheres and finally third inflation at 5 atmospheres. Next, a 3 x 12 mm stent was passed into the posterolateral marginal  branch and deployed at 16 atmospheres. Repeat injections now revealed  wide patency of both areas of stent deployment and for this reason, the  procedure was concluded and adequate hemostasis was obtained deploying a  6-[place] ExoSeal device. This was followed by 10-pound sandbag. CONCLUSION  Successful coronary artery angioplasty with stent deployment  to dominant right coronary artery and occluded nondominant circumflex  artery. No obvious complications noted. ESTIMATED BLOOD LOSS:  Less than 10 mL. CONTRAST UTILIZED:  278 mL. FLUOROSCOPY TIME:  17.9 minutes.         JHONATHAN ADKINS DO    D: 05/12/2021 21:35:32       T: 05/12/2021 21:41:57     [email or web address]  Job#: [id number]     Doc#: [id number]    CC: